# Patient Record
Sex: FEMALE
[De-identification: names, ages, dates, MRNs, and addresses within clinical notes are randomized per-mention and may not be internally consistent; named-entity substitution may affect disease eponyms.]

---

## 2017-08-05 ENCOUNTER — CHARTING TRANS (OUTPATIENT)
Dept: OTHER | Age: 52
End: 2017-08-05

## 2018-05-25 ENCOUNTER — OFFICE VISIT (OUTPATIENT)
Dept: INTERNAL MEDICINE CLINIC | Facility: CLINIC | Age: 53
End: 2018-05-25

## 2018-05-25 VITALS
HEIGHT: 64 IN | WEIGHT: 165 LBS | HEART RATE: 91 BPM | SYSTOLIC BLOOD PRESSURE: 120 MMHG | BODY MASS INDEX: 28.17 KG/M2 | DIASTOLIC BLOOD PRESSURE: 80 MMHG | TEMPERATURE: 99 F

## 2018-05-25 DIAGNOSIS — M25.551 PAIN OF RIGHT HIP JOINT: Primary | ICD-10-CM

## 2018-05-25 PROCEDURE — 99212 OFFICE O/P EST SF 10 MIN: CPT | Performed by: INTERNAL MEDICINE

## 2018-05-25 PROCEDURE — 99213 OFFICE O/P EST LOW 20 MIN: CPT | Performed by: INTERNAL MEDICINE

## 2018-05-25 RX ORDER — METHOCARBAMOL 500 MG/1
500 TABLET, FILM COATED ORAL 3 TIMES DAILY
Qty: 40 TABLET | Refills: 0 | Status: SHIPPED | OUTPATIENT
Start: 2018-05-25 | End: 2018-07-24

## 2018-05-25 RX ORDER — METHYLPREDNISOLONE 4 MG/1
TABLET ORAL
Qty: 1 KIT | Refills: 0 | Status: SHIPPED | OUTPATIENT
Start: 2018-05-25 | End: 2020-08-20 | Stop reason: ALTCHOICE

## 2018-05-25 RX ORDER — HYDROCODONE BITARTRATE AND ACETAMINOPHEN 7.5; 325 MG/1; MG/1
1 TABLET ORAL EVERY 6 HOURS PRN
Qty: 60 TABLET | Refills: 0 | Status: SHIPPED | OUTPATIENT
Start: 2018-05-25 | End: 2018-06-24

## 2018-05-25 NOTE — PROGRESS NOTES
HPI:    Patient ID: Herman Jeronimo is a 46year old female. HPI  right leg swelling -poss pulled muscle by groin area x 1 wk \      Review of Systems   Constitutional: Negative. Respiratory: Negative. Cardiovascular: Negative.     Gastrointestinal: voiced understanding  and agrees with plan        Meds This Visit:  No prescriptions requested or ordered in this encounter    Imaging & Referrals:  None        TT#[id number]

## 2018-11-03 VITALS
SYSTOLIC BLOOD PRESSURE: 120 MMHG | TEMPERATURE: 98.7 F | HEIGHT: 62 IN | RESPIRATION RATE: 20 BRPM | WEIGHT: 152.24 LBS | BODY MASS INDEX: 28.01 KG/M2 | HEART RATE: 89 BPM | DIASTOLIC BLOOD PRESSURE: 60 MMHG

## 2020-03-31 ENCOUNTER — TELEPHONE (OUTPATIENT)
Dept: INTERNAL MEDICINE CLINIC | Facility: CLINIC | Age: 55
End: 2020-03-31

## 2020-03-31 NOTE — TELEPHONE ENCOUNTER
Patient requesting refill for SYNTHROID. States she is now taking 0.122mg. patient scheduled appointment for a physical and pap on 5/1. Patient has not received medication from  since 2016.  Please advice

## 2020-03-31 NOTE — TELEPHONE ENCOUNTER
Pt called back states she was seeing [facility] and last time seen  was more than 1.5 yrs . Pt is currently taking 112 mcg of Levothyroxine . Informed Pt last OV was 2018 and last blood work was 2016 .  Advised Pt Dr Tiffani Gilbert can do a telephone visit

## 2020-04-01 NOTE — TELEPHONE ENCOUNTER
Pt called back informed medication was sent to pharmacy . Pt scheduled a follow up appointment  for 05/01/20 with Dr Jim Ritter .

## 2020-05-01 ENCOUNTER — TELEPHONE (OUTPATIENT)
Dept: INTERNAL MEDICINE CLINIC | Facility: CLINIC | Age: 55
End: 2020-05-01

## 2020-05-01 NOTE — TELEPHONE ENCOUNTER
I have tried calling pt  Busy signal  And  Voice mail only  Left a message to call   To let me know when is the best time to talk to her  Please re schedule  Telephone visit

## 2020-05-04 ENCOUNTER — TELEPHONE (OUTPATIENT)
Dept: INTERNAL MEDICINE CLINIC | Facility: CLINIC | Age: 55
End: 2020-05-04

## 2020-05-04 ENCOUNTER — VIRTUAL PHONE E/M (OUTPATIENT)
Dept: INTERNAL MEDICINE CLINIC | Facility: CLINIC | Age: 55
End: 2020-05-04
Payer: COMMERCIAL

## 2020-05-04 DIAGNOSIS — E03.9 HYPOTHYROIDISM, UNSPECIFIED TYPE: ICD-10-CM

## 2020-05-04 DIAGNOSIS — Z12.31 VISIT FOR SCREENING MAMMOGRAM: ICD-10-CM

## 2020-05-04 DIAGNOSIS — R63.5 WEIGHT GAIN: Primary | ICD-10-CM

## 2020-05-04 DIAGNOSIS — Z12.11 COLON CANCER SCREENING: ICD-10-CM

## 2020-05-04 DIAGNOSIS — E55.9 VITAMIN D DEFICIENCY: ICD-10-CM

## 2020-05-04 PROCEDURE — 99214 OFFICE O/P EST MOD 30 MIN: CPT | Performed by: INTERNAL MEDICINE

## 2020-05-04 NOTE — TELEPHONE ENCOUNTER
I called 4 times goes directly to voicemail  [facility]  [facility] number  W number voicemail as well

## 2020-05-05 NOTE — PROGRESS NOTES
Virtual Telephone Check-In    Maria Abeverley Mars verbally consents to a Virtual/Telephone Check-In visit on 05/04/20. Patient understands and accepts financial responsibility for any deductible, co-insurance and/or co-pays associated with this service.     D swelling. Skin: Negative for rash. Neurological: Negative for syncope, weakness, light-headedness and headaches. Hematological: Negative for adenopathy. Does not bruise/bleed easily.    Psychiatric/Behavioral: Negative for agitation and behavioral pro screening mammogram  Plan: [facility] 2D+3D SCREENING BILAT         (CPT=77067/05497)        Self breast exam advised    (Z12.11) Colon cancer screening  Plan: OP REFERRAL TO [facility] TELEPHONE COLON SCREEN        Follow-up for colonoscopy advised    Low back

## 2020-05-09 ENCOUNTER — APPOINTMENT (OUTPATIENT)
Dept: LAB | Age: 55
End: 2020-05-09
Attending: INTERNAL MEDICINE
Payer: COMMERCIAL

## 2020-05-09 DIAGNOSIS — R63.5 WEIGHT GAIN: ICD-10-CM

## 2020-05-09 DIAGNOSIS — E55.9 VITAMIN D DEFICIENCY: ICD-10-CM

## 2020-05-09 PROCEDURE — 84439 ASSAY OF FREE THYROXINE: CPT | Performed by: INTERNAL MEDICINE

## 2020-05-09 PROCEDURE — 36415 COLL VENOUS BLD VENIPUNCTURE: CPT | Performed by: INTERNAL MEDICINE

## 2020-05-09 PROCEDURE — 84443 ASSAY THYROID STIM HORMONE: CPT | Performed by: INTERNAL MEDICINE

## 2020-05-09 PROCEDURE — 80061 LIPID PANEL: CPT | Performed by: INTERNAL MEDICINE

## 2020-05-09 PROCEDURE — 81015 MICROSCOPIC EXAM OF URINE: CPT

## 2020-05-09 PROCEDURE — 80053 COMPREHEN METABOLIC PANEL: CPT | Performed by: INTERNAL MEDICINE

## 2020-05-09 PROCEDURE — 85027 COMPLETE CBC AUTOMATED: CPT | Performed by: INTERNAL MEDICINE

## 2020-05-09 PROCEDURE — 82306 VITAMIN D 25 HYDROXY: CPT | Performed by: INTERNAL MEDICINE

## 2020-05-09 PROCEDURE — 83036 HEMOGLOBIN GLYCOSYLATED A1C: CPT | Performed by: INTERNAL MEDICINE

## 2020-05-13 NOTE — TELEPHONE ENCOUNTER
Patient called, requesting refill of the following prescription.     Levothyroxine Sodium 112 MCG Oral Tab

## 2020-05-14 RX ORDER — LEVOTHYROXINE SODIUM 0.12 MG/1
125 TABLET ORAL DAILY
Qty: 90 TABLET | Refills: 0 | OUTPATIENT
Start: 2020-05-14

## 2020-05-14 NOTE — TELEPHONE ENCOUNTER
This is being sent to you without review by the Triage staff due to the high call volumes created by the COVID-19 virus, per the email sent by Dr. Zoe Swain on 3/19/20. Thank you for your support.     Centralized Nurse Triage Team

## 2020-08-20 ENCOUNTER — NURSE TRIAGE (OUTPATIENT)
Dept: INTERNAL MEDICINE CLINIC | Facility: CLINIC | Age: 55
End: 2020-08-20

## 2020-08-20 ENCOUNTER — VIRTUAL PHONE E/M (OUTPATIENT)
Dept: INTERNAL MEDICINE CLINIC | Facility: CLINIC | Age: 55
End: 2020-08-20
Payer: COMMERCIAL

## 2020-08-20 ENCOUNTER — LAB ENCOUNTER (OUTPATIENT)
Dept: LAB | Facility: HOSPITAL | Age: 55
End: 2020-08-20
Attending: INTERNAL MEDICINE
Payer: COMMERCIAL

## 2020-08-20 DIAGNOSIS — R50.9 FEVER, UNSPECIFIED FEVER CAUSE: Primary | ICD-10-CM

## 2020-08-20 DIAGNOSIS — R52 BODY ACHES: ICD-10-CM

## 2020-08-20 DIAGNOSIS — R51.9 PERSISTENT HEADACHES: ICD-10-CM

## 2020-08-20 DIAGNOSIS — R50.9 FEVER, UNSPECIFIED FEVER CAUSE: ICD-10-CM

## 2020-08-20 DIAGNOSIS — E03.9 HYPOTHYROIDISM, UNSPECIFIED TYPE: Chronic | ICD-10-CM

## 2020-08-20 PROCEDURE — 99213 OFFICE O/P EST LOW 20 MIN: CPT | Performed by: INTERNAL MEDICINE

## 2020-08-20 RX ORDER — LEVOTHYROXINE SODIUM 137 UG/1
137 TABLET ORAL
Qty: 90 TABLET | Refills: 0 | Status: SHIPPED | OUTPATIENT
Start: 2020-08-20 | End: 2020-09-10 | Stop reason: DRUGHIGH

## 2020-08-20 RX ORDER — LEVOTHYROXINE SODIUM 137 UG/1
137 TABLET ORAL
Qty: 90 TABLET | Refills: 0 | Status: SHIPPED | OUTPATIENT
Start: 2020-08-20 | End: 2020-08-20

## 2020-08-20 NOTE — TELEPHONE ENCOUNTER
Action Requested: Summary for Provider     []  Critical Lab, Recommendations Needed  [] Need Additional Advice  []   FYI    []   Need Orders  [] Need Medications Sent to Pharmacy  []  Other     SUMMARY: per patient she went to a long term party on Tuesday

## 2020-08-20 NOTE — PROGRESS NOTES
Telemedicine Visit for Respiratory Illness - Potential COVID-19 Infection    Virtual/Telephone Check-In     Geneva Kong verbally consents to a Telephone Check-In service on 08/20/20.  Patient understands and accepts financial responsibility for any deduc Active Problem List:     Left SNHL     Hypothyroidism    Current Outpatient Medications   Medication Sig Dispense Refill   • Levothyroxine Sodium 137 MCG Oral Tab Take 137 mcg by mouth before breakfast. 90 tablet 0     No Known Allergies    Physical Exam

## 2020-08-21 ENCOUNTER — TELEPHONE (OUTPATIENT)
Dept: INTERNAL MEDICINE CLINIC | Facility: CLINIC | Age: 55
End: 2020-08-21

## 2020-08-21 DIAGNOSIS — R50.9 FEVER, UNSPECIFIED FEVER CAUSE: Primary | ICD-10-CM

## 2020-08-21 LAB — SARS-COV-2 RNA RESP QL NAA+PROBE: NOT DETECTED

## 2020-08-21 NOTE — TELEPHONE ENCOUNTER
Spoke with patient ( verified)--requesting Cvid test results from yesterday. Relayed to patient test shows in process--unable to determine when results will be finalized--patient verbalizes understanding and agreement.     Sent to Dr. Meng Russo per teena

## 2020-08-21 NOTE — TELEPHONE ENCOUNTER
Spoke with patient ( verified) and relayed Dr. Felisa Packer message below--patient verbalizes understanding, but reports:    \"I have been in bed all day and my temperature keeps fluctuating--101.9, then 98.9, then back up again.  I must have some kind of b

## 2020-08-22 ENCOUNTER — APPOINTMENT (OUTPATIENT)
Dept: LAB | Age: 55
End: 2020-08-22
Attending: INTERNAL MEDICINE
Payer: COMMERCIAL

## 2020-08-22 LAB
ALBUMIN SERPL-MCNC: 2.8 G/DL (ref 3.4–5)
ALBUMIN/GLOB SERPL: 0.5 {RATIO} (ref 1–2)
ALP LIVER SERPL-CCNC: 73 U/L (ref 41–108)
ALT SERPL-CCNC: 19 U/L (ref 13–56)
ANION GAP SERPL CALC-SCNC: 7 MMOL/L (ref 0–18)
AST SERPL-CCNC: 19 U/L (ref 15–37)
BILIRUB SERPL-MCNC: 0.7 MG/DL (ref 0.1–2)
BILIRUB UR QL: NEGATIVE
BUN BLD-MCNC: 18 MG/DL (ref 7–18)
BUN/CREAT SERPL: 22.5 (ref 10–20)
CALCIUM BLD-MCNC: 9 MG/DL (ref 8.5–10.1)
CHLORIDE SERPL-SCNC: 98 MMOL/L (ref 98–112)
CO2 SERPL-SCNC: 27 MMOL/L (ref 21–32)
COLOR UR: YELLOW
CREAT BLD-MCNC: 0.8 MG/DL (ref 0.55–1.02)
GLOBULIN PLAS-MCNC: 5.3 G/DL (ref 2.8–4.4)
GLUCOSE BLD-MCNC: 124 MG/DL (ref 70–99)
GLUCOSE UR-MCNC: 50 MG/DL
HYALINE CASTS #/AREA URNS AUTO: 25 /LPF
KETONES UR-MCNC: 20 MG/DL
LEUKOCYTE ESTERASE UR QL STRIP.AUTO: NEGATIVE
M PROTEIN MFR SERPL ELPH: 8.1 G/DL (ref 6.4–8.2)
NITRITE UR QL STRIP.AUTO: NEGATIVE
OSMOLALITY SERPL CALC.SUM OF ELEC: 277 MOSM/KG (ref 275–295)
PATIENT FASTING Y/N/NP: YES
PH UR: 6 [PH] (ref 5–8)
POTASSIUM SERPL-SCNC: 3.8 MMOL/L (ref 3.5–5.1)
PROT UR-MCNC: >=500 MG/DL
RBC #/AREA URNS AUTO: 9 /HPF
SODIUM SERPL-SCNC: 132 MMOL/L (ref 136–145)
UROBILINOGEN UR STRIP-ACNC: <2
WBC #/AREA URNS AUTO: 5 /HPF

## 2020-08-22 PROCEDURE — 84466 ASSAY OF TRANSFERRIN: CPT | Performed by: INTERNAL MEDICINE

## 2020-08-22 PROCEDURE — 80053 COMPREHEN METABOLIC PANEL: CPT | Performed by: INTERNAL MEDICINE

## 2020-08-22 PROCEDURE — 36415 COLL VENOUS BLD VENIPUNCTURE: CPT | Performed by: INTERNAL MEDICINE

## 2020-08-22 PROCEDURE — 85025 COMPLETE CBC W/AUTO DIFF WBC: CPT | Performed by: INTERNAL MEDICINE

## 2020-08-22 PROCEDURE — 84443 ASSAY THYROID STIM HORMONE: CPT | Performed by: INTERNAL MEDICINE

## 2020-08-22 PROCEDURE — 82746 ASSAY OF FOLIC ACID SERUM: CPT | Performed by: INTERNAL MEDICINE

## 2020-08-22 PROCEDURE — 83540 ASSAY OF IRON: CPT | Performed by: INTERNAL MEDICINE

## 2020-08-22 PROCEDURE — 82607 VITAMIN B-12: CPT | Performed by: INTERNAL MEDICINE

## 2020-08-22 PROCEDURE — 81001 URINALYSIS AUTO W/SCOPE: CPT | Performed by: INTERNAL MEDICINE

## 2020-08-22 NOTE — TELEPHONE ENCOUNTER
Still with fever no other sympt    No urinary sympt ,no sore throat , no ear ache   Has nausea   No breathing difficulty   Upset stomach , not watery    Had this once before --   BRAT DIET , STAY HYDRATED CALL BACK IF NOT BETTER  IF WORSENING THEN GO TO ER

## 2020-08-23 ENCOUNTER — TELEPHONE (OUTPATIENT)
Dept: INTERNAL MEDICINE CLINIC | Facility: CLINIC | Age: 55
End: 2020-08-23

## 2020-08-23 DIAGNOSIS — R80.9 PROTEINURIA, UNSPECIFIED TYPE: ICD-10-CM

## 2020-08-23 DIAGNOSIS — E03.9 HYPOTHYROIDISM, UNSPECIFIED TYPE: Primary | ICD-10-CM

## 2020-08-23 DIAGNOSIS — R50.9 FEVER, UNSPECIFIED FEVER CAUSE: ICD-10-CM

## 2020-08-23 NOTE — TELEPHONE ENCOUNTER
Reviewed labs-noted tsh is low but pt states she hasnt been taking it for 4-5 days --   Advised to hold off on taking the thyroid medications  Most likely will be needing a lower dose  Advised patient to follow-up with PCP  Referral for nephrology placed f

## 2020-08-24 ENCOUNTER — APPOINTMENT (OUTPATIENT)
Dept: LAB | Age: 55
End: 2020-08-24
Attending: INTERNAL MEDICINE
Payer: COMMERCIAL

## 2020-08-24 DIAGNOSIS — R50.9 FEVER, UNSPECIFIED FEVER CAUSE: ICD-10-CM

## 2020-08-25 ENCOUNTER — TELEPHONE (OUTPATIENT)
Dept: INTERNAL MEDICINE CLINIC | Facility: CLINIC | Age: 55
End: 2020-08-25

## 2020-08-25 LAB — SARS-COV-2 RNA RESP QL NAA+PROBE: NOT DETECTED

## 2020-09-09 ENCOUNTER — TELEPHONE (OUTPATIENT)
Dept: INTERNAL MEDICINE CLINIC | Facility: CLINIC | Age: 55
End: 2020-09-09

## 2020-09-09 DIAGNOSIS — E03.9 HYPOTHYROIDISM, UNSPECIFIED TYPE: Primary | Chronic | ICD-10-CM

## 2020-09-09 NOTE — TELEPHONE ENCOUNTER
Okay to send instructions via [employer]. Pt stts  advised that she would be contacted by  with a new dose for levothyroxine. Pt has not rec'd any direction on medication.   Please advise as pt has not taken anything and now she's feeli

## 2020-09-09 NOTE — TELEPHONE ENCOUNTER
I tired to call pt  tsh is low  Which means lower  Thyroid med dose  She is on 137  Lower to 125 mcg po qd   erx sent to pharmacy for   Repeat tsh in 2 mnths

## 2020-09-09 NOTE — TELEPHONE ENCOUNTER
Pt was told based on her blood work that the current dosage is too high on Rx levothyroxine 137 MCG. Pt said she has not been taking it.   Please advise        Current Outpatient Medications   Medication Sig Dispense Refill   • Levothyroxine Sodium 137 MCG

## 2020-09-09 NOTE — TELEPHONE ENCOUNTER
U spoke with pt  Stress test this Friday 8AM  Clearance needed by Friday  Latest by [facility]

## 2020-09-10 ENCOUNTER — PATIENT MESSAGE (OUTPATIENT)
Dept: INTERNAL MEDICINE CLINIC | Facility: CLINIC | Age: 55
End: 2020-09-10

## 2020-09-10 NOTE — TELEPHONE ENCOUNTER
Went over 8/22/2020 lab results and notes below with patient. Scheduled nurse visit for tomorrow for a B-12 injection. Advised to disregard the disclaimer about biotin if she is not taking it. Patient did not have any further questions.    Viewed by [employer]

## 2020-09-10 NOTE — TELEPHONE ENCOUNTER
Patient was left a message to call back. Transfer to triage dept [id number]    Left msg that new Rx sent 9/9/10 and repeat TSH in 2 months. Order in chart. Patient to call back if has any questions and confirm she received message.    When she calls, remind her

## 2020-09-10 NOTE — TELEPHONE ENCOUNTER
From: Noemy Monday  To: Demarcus Ashton MD  Sent: 9/10/2020 7:46 AM CDT  Subject: Test Results Question    Good morning Dr Mary Burnham,  I’m perplexed by my test results, most of the results that are out of range states that it may have to do with taking Bio

## 2020-09-11 ENCOUNTER — NURSE ONLY (OUTPATIENT)
Dept: INTERNAL MEDICINE CLINIC | Facility: CLINIC | Age: 55
End: 2020-09-11
Payer: COMMERCIAL

## 2020-09-11 ENCOUNTER — TELEPHONE (OUTPATIENT)
Dept: INTERNAL MEDICINE CLINIC | Facility: CLINIC | Age: 55
End: 2020-09-11

## 2020-09-11 DIAGNOSIS — E53.8 VITAMIN B 12 DEFICIENCY: Primary | ICD-10-CM

## 2020-09-11 PROCEDURE — 96372 THER/PROPH/DIAG INJ SC/IM: CPT | Performed by: INTERNAL MEDICINE

## 2020-09-11 RX ADMIN — CYANOCOBALAMIN 1000 MCG: 1000 INJECTION INTRAMUSCULAR; SUBCUTANEOUS at 16:07:00

## 2020-09-11 NOTE — PROGRESS NOTES
Pt. Audrey Atkinson to office as a nurse visit name and date of birth verified, pt received a B-12 vaccine on 9/11/2020. Pt tolerated well vaccine and no reaction.

## 2020-09-11 NOTE — TELEPHONE ENCOUNTER
Dr. Deepa Mcclain pt is scheduled for Nurse visit today 9/11/2020 for B12 injection. Can you place order for injection? Also, please verify how many times you would like pt to return for B12 injections.  Thanks

## 2020-12-02 ENCOUNTER — HOSPITAL ENCOUNTER (OUTPATIENT)
Dept: MAMMOGRAPHY | Age: 55
Discharge: HOME | End: 2020-12-02
Attending: INTERNAL MEDICINE
Payer: COMMERCIAL

## 2020-12-02 DIAGNOSIS — Z12.31 VISIT FOR SCREENING MAMMOGRAM: ICD-10-CM

## 2020-12-02 PROCEDURE — 77067 SCR MAMMO BI INCL CAD: CPT | Performed by: INTERNAL MEDICINE

## 2020-12-02 PROCEDURE — 77063 BREAST TOMOSYNTHESIS BI: CPT | Performed by: INTERNAL MEDICINE

## 2020-12-23 RX ORDER — LEVOTHYROXINE SODIUM 0.12 MG/1
125 TABLET ORAL
Qty: 90 TABLET | Refills: 0 | Status: SHIPPED | OUTPATIENT
Start: 2020-12-23 | End: 2021-03-23

## 2020-12-25 RX ORDER — ERGOCALCIFEROL (VITAMIN D2) 1250 MCG
50000 CAPSULE ORAL WEEKLY
Qty: 12 CAPSULE | Refills: 1 | Status: SHIPPED | OUTPATIENT
Start: 2020-12-25 | End: 2021-03-25

## 2021-03-23 RX ORDER — LEVOTHYROXINE SODIUM 0.12 MG/1
125 TABLET ORAL
Qty: 90 TABLET | Refills: 0 | Status: SHIPPED | OUTPATIENT
Start: 2021-03-23 | End: 2021-09-18

## 2021-03-23 NOTE — TELEPHONE ENCOUNTER
Per pharmacy pt is requesting refill for the following medication.       •  Levothyroxine Sodium 125 MCG Oral Tab, Take 1 tablet (125 mcg total) by mouth before breakfast., Disp: 90 tablet, Rfl: 0

## 2021-09-05 NOTE — TELEPHONE ENCOUNTER
Problems   The patient or proxy has not reviewed this information.    Medications   The patient or proxy has not reviewed this information, and there are updates pending:   Requested Medication Additions Start Date Reported By  Comments   JACQUI pt reports overall good PO intake and appetite PTA, though notes eating less the last few days due to feeling unwell. Consume regular diet, no restrictions or food allergies. Pt denies chewing/swallowing difficulty, nausea, vomiting, diarrhea, constipation PTA. Takes multivitamin, folic acid and vitamin B12.

## 2021-09-17 ENCOUNTER — TELEPHONE (OUTPATIENT)
Dept: INTERNAL MEDICINE CLINIC | Facility: CLINIC | Age: 56
End: 2021-09-17

## 2021-09-17 NOTE — TELEPHONE ENCOUNTER
Spoke with patient--asking Dr. Yaron Rowan for yearly lab orders, as she has been out of Levothyroxine for at least 3 months and is aware PCP would need to know her level before sending in new Rx.  Patient reports weight gain, fatigue and hair loss since not ta

## 2021-09-18 ENCOUNTER — LAB ENCOUNTER (OUTPATIENT)
Dept: LAB | Age: 56
End: 2021-09-18
Attending: INTERNAL MEDICINE
Payer: COMMERCIAL

## 2021-09-18 DIAGNOSIS — Z00.00 ROUTINE GENERAL MEDICAL EXAMINATION AT A HEALTH CARE FACILITY: ICD-10-CM

## 2021-09-18 LAB
ALBUMIN SERPL-MCNC: 4 G/DL (ref 3.4–5)
ALBUMIN/GLOB SERPL: 0.9 {RATIO} (ref 1–2)
ALP LIVER SERPL-CCNC: 82 U/L
ALT SERPL-CCNC: 38 U/L
ANION GAP SERPL CALC-SCNC: 6 MMOL/L (ref 0–18)
AST SERPL-CCNC: 25 U/L (ref 15–37)
BILIRUB SERPL-MCNC: 0.4 MG/DL (ref 0.1–2)
BUN BLD-MCNC: 19 MG/DL (ref 7–18)
BUN/CREAT SERPL: 25.7 (ref 10–20)
CALCIUM BLD-MCNC: 9.3 MG/DL (ref 8.5–10.1)
CHLORIDE SERPL-SCNC: 110 MMOL/L (ref 98–112)
CHOLEST SERPL-MCNC: 229 MG/DL (ref ?–200)
CO2 SERPL-SCNC: 26 MMOL/L (ref 21–32)
CREAT BLD-MCNC: 0.74 MG/DL
DEPRECATED RDW RBC AUTO: 45.1 FL (ref 35.1–46.3)
ERYTHROCYTE [DISTWIDTH] IN BLOOD BY AUTOMATED COUNT: 13.3 % (ref 11–15)
EST. AVERAGE GLUCOSE BLD GHB EST-MCNC: 114 MG/DL (ref 68–126)
GLOBULIN PLAS-MCNC: 4.6 G/DL (ref 2.8–4.4)
GLUCOSE BLD-MCNC: 67 MG/DL (ref 70–99)
HBA1C MFR BLD HPLC: 5.6 % (ref ?–5.7)
HCT VFR BLD AUTO: 35.2 %
HDLC SERPL-MCNC: 75 MG/DL (ref 40–59)
HGB BLD-MCNC: 11.3 G/DL
LDLC SERPL CALC-MCNC: 143 MG/DL (ref ?–100)
MCH RBC QN AUTO: 29.7 PG (ref 26–34)
MCHC RBC AUTO-ENTMCNC: 32.1 G/DL (ref 31–37)
MCV RBC AUTO: 92.4 FL
NONHDLC SERPL-MCNC: 154 MG/DL (ref ?–130)
OSMOLALITY SERPL CALC.SUM OF ELEC: 295 MOSM/KG (ref 275–295)
PATIENT FASTING Y/N/NP: YES
PATIENT FASTING Y/N/NP: YES
PLATELET # BLD AUTO: 209 10(3)UL (ref 150–450)
POTASSIUM SERPL-SCNC: 4.2 MMOL/L (ref 3.5–5.1)
PROT SERPL-MCNC: 8.6 G/DL (ref 6.4–8.2)
RBC # BLD AUTO: 3.81 X10(6)UL
SODIUM SERPL-SCNC: 142 MMOL/L (ref 136–145)
T4 FREE SERPL-MCNC: 0.5 NG/DL (ref 0.8–1.7)
TRIGL SERPL-MCNC: 65 MG/DL (ref 30–149)
TSI SER-ACNC: 88.3 MIU/ML (ref 0.36–3.74)
VLDLC SERPL CALC-MCNC: 12 MG/DL (ref 0–30)
WBC # BLD AUTO: 3.6 X10(3) UL (ref 4–11)

## 2021-09-18 PROCEDURE — 80061 LIPID PANEL: CPT

## 2021-09-18 PROCEDURE — 36415 COLL VENOUS BLD VENIPUNCTURE: CPT

## 2021-09-18 PROCEDURE — 84443 ASSAY THYROID STIM HORMONE: CPT

## 2021-09-18 PROCEDURE — 83036 HEMOGLOBIN GLYCOSYLATED A1C: CPT

## 2021-09-18 PROCEDURE — 85027 COMPLETE CBC AUTOMATED: CPT

## 2021-09-18 PROCEDURE — 81015 MICROSCOPIC EXAM OF URINE: CPT

## 2021-09-18 PROCEDURE — 84439 ASSAY OF FREE THYROXINE: CPT

## 2021-09-18 PROCEDURE — 80053 COMPREHEN METABOLIC PANEL: CPT

## 2021-09-18 RX ORDER — LEVOTHYROXINE SODIUM 0.12 MG/1
125 TABLET ORAL
Qty: 90 TABLET | Refills: 0 | Status: CANCELLED | OUTPATIENT
Start: 2021-09-18

## 2021-09-18 NOTE — TELEPHONE ENCOUNTER
Spoke with patient ( verified) and relayed Dr. Shana Chisholm message below and will complete fasting labs-patient verbalizes understanding and agreement. No further questions/concerns at this time.

## 2021-11-05 ENCOUNTER — OFFICE VISIT (OUTPATIENT)
Dept: INTERNAL MEDICINE CLINIC | Facility: CLINIC | Age: 56
End: 2021-11-05
Payer: COMMERCIAL

## 2021-11-05 VITALS
SYSTOLIC BLOOD PRESSURE: 127 MMHG | DIASTOLIC BLOOD PRESSURE: 84 MMHG | HEIGHT: 63 IN | HEART RATE: 78 BPM | WEIGHT: 168 LBS | BODY MASS INDEX: 29.77 KG/M2

## 2021-11-05 DIAGNOSIS — Z01.419 GYNECOLOGIC EXAM NORMAL: ICD-10-CM

## 2021-11-05 DIAGNOSIS — E53.8 VITAMIN B 12 DEFICIENCY: ICD-10-CM

## 2021-11-05 DIAGNOSIS — Z12.11 COLON CANCER SCREENING: ICD-10-CM

## 2021-11-05 DIAGNOSIS — E03.9 HYPOTHYROIDISM, UNSPECIFIED TYPE: ICD-10-CM

## 2021-11-05 DIAGNOSIS — M54.2 NECK PAIN: ICD-10-CM

## 2021-11-05 DIAGNOSIS — D17.0 LIPOMA OF NECK: ICD-10-CM

## 2021-11-05 DIAGNOSIS — Z00.00 ROUTINE GENERAL MEDICAL EXAMINATION AT A HEALTH CARE FACILITY: Primary | ICD-10-CM

## 2021-11-05 DIAGNOSIS — Z12.31 VISIT FOR SCREENING MAMMOGRAM: ICD-10-CM

## 2021-11-05 PROCEDURE — 3008F BODY MASS INDEX DOCD: CPT | Performed by: INTERNAL MEDICINE

## 2021-11-05 PROCEDURE — 99396 PREV VISIT EST AGE 40-64: CPT | Performed by: INTERNAL MEDICINE

## 2021-11-05 PROCEDURE — 3074F SYST BP LT 130 MM HG: CPT | Performed by: INTERNAL MEDICINE

## 2021-11-05 PROCEDURE — 3079F DIAST BP 80-89 MM HG: CPT | Performed by: INTERNAL MEDICINE

## 2021-11-05 RX ORDER — CYANOCOBALAMIN 1000 UG/ML
1000 INJECTION INTRAMUSCULAR; SUBCUTANEOUS
Status: SHIPPED | OUTPATIENT
Start: 2021-11-05

## 2021-11-05 RX ORDER — ERGOCALCIFEROL (VITAMIN D2) 1250 MCG
50000 CAPSULE ORAL WEEKLY
Qty: 12 CAPSULE | Refills: 0 | Status: SHIPPED | OUTPATIENT
Start: 2021-11-05 | End: 2022-02-03

## 2021-11-16 NOTE — PROGRESS NOTES
HPI:    Patient ID: Tara Keating is a 64year old female.     HPI    Examination  Generally healthy  /84 (BP Location: Left arm, Patient Position: Sitting, Cuff Size: adult)   Pulse 78   Ht 5' 3\" (1.6 m)   Wt 168 lb (76.2 kg)   LMP 01/01/2012 (Appr mouth before breakfast. 90 tablet 0     Allergies:No Known Allergies    HISTORY:  Past Medical History:   Diagnosis Date   • Cyst of left breast 2009    drainage   • Primary hypothyroidism       Past Surgical History:   Procedure Laterality Date   • CYST A Findings: No erythema or rash. Neurological:      Mental Status: She is alert.               ASSESSMENT/PLAN:     (Z00.00) Routine general medical examination at a health care facility  (primary encounter diagnosis)  Plan: generally healthy  Colon ca scre

## 2021-11-29 ENCOUNTER — OFFICE VISIT (OUTPATIENT)
Dept: OTOLARYNGOLOGY | Facility: CLINIC | Age: 56
End: 2021-11-29
Payer: COMMERCIAL

## 2021-11-29 VITALS — HEIGHT: 64 IN | BODY MASS INDEX: 28.17 KG/M2 | WEIGHT: 165 LBS

## 2021-11-29 DIAGNOSIS — M54.2 NECK PAIN: ICD-10-CM

## 2021-11-29 DIAGNOSIS — R22.1 NECK MASS: ICD-10-CM

## 2021-11-29 DIAGNOSIS — G47.33 OBSTRUCTIVE SLEEP APNEA: Primary | ICD-10-CM

## 2021-11-29 DIAGNOSIS — G47.33 OSA (OBSTRUCTIVE SLEEP APNEA): ICD-10-CM

## 2021-11-29 PROCEDURE — 3008F BODY MASS INDEX DOCD: CPT | Performed by: OTOLARYNGOLOGY

## 2021-11-29 PROCEDURE — 99243 OFF/OP CNSLTJ NEW/EST LOW 30: CPT | Performed by: OTOLARYNGOLOGY

## 2021-11-29 RX ORDER — CYCLOBENZAPRINE HCL 5 MG
5 TABLET ORAL NIGHTLY
Qty: 30 TABLET | Refills: 1 | Status: SHIPPED | OUTPATIENT
Start: 2021-11-29

## 2021-11-29 RX ORDER — CELECOXIB 200 MG/1
200 CAPSULE ORAL DAILY PRN
Qty: 30 CAPSULE | Refills: 0 | Status: SHIPPED | OUTPATIENT
Start: 2021-11-29 | End: 2021-12-29

## 2021-11-30 NOTE — PROGRESS NOTES
Joss Goldberg is a 64year old female. Patient presents with:  Neck Pain: Pt has neck pain behind neck started a couple months ago .  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 Cardio Negative Chest pain, irregular heartbeat/palpitations and syncope. GI Negative Abdominal pain and diarrhea. Endocrine Negative Cold intolerance and heat intolerance. Neuro Negative Tremors.    Psych Negative Anxiety and Take 1 capsule (50,000 Units total) by mouth once a week., Disp: 12 capsule, Rfl: 0  •  ergocalciferol 1.25 MG (68663 UT) Oral Cap, Take 1 capsule (50,000 Units total) by mouth every 30 (thirty) days. , Disp: 3 capsule, Rfl: 3  •  levothyroxine 125 MCG Oral

## 2021-12-01 ENCOUNTER — TELEPHONE (OUTPATIENT)
Dept: OTOLARYNGOLOGY | Facility: CLINIC | Age: 56
End: 2021-12-01

## 2021-12-01 NOTE — TELEPHONE ENCOUNTER
Patient requesting MRI to be resend as an URGENT matter in order for insurance to authorize it within 3 days.  Please advise

## 2021-12-20 ENCOUNTER — LAB ENCOUNTER (OUTPATIENT)
Dept: LAB | Age: 56
End: 2021-12-20
Attending: INTERNAL MEDICINE
Payer: COMMERCIAL

## 2021-12-20 DIAGNOSIS — E03.9 HYPOTHYROIDISM, UNSPECIFIED TYPE: ICD-10-CM

## 2021-12-20 PROCEDURE — 84443 ASSAY THYROID STIM HORMONE: CPT

## 2021-12-20 PROCEDURE — 36415 COLL VENOUS BLD VENIPUNCTURE: CPT

## 2021-12-20 PROCEDURE — 84439 ASSAY OF FREE THYROXINE: CPT

## 2021-12-21 RX ORDER — LEVOTHYROXINE SODIUM 0.12 MG/1
TABLET ORAL
Qty: 90 TABLET | Refills: 0 | Status: SHIPPED | OUTPATIENT
Start: 2021-12-21 | End: 2021-12-22

## 2021-12-21 RX ORDER — LEVOTHYROXINE SODIUM 0.12 MG/1
125 TABLET ORAL
Qty: 90 TABLET | Refills: 0 | Status: SHIPPED | OUTPATIENT
Start: 2021-12-21 | End: 2021-12-22

## 2022-03-25 ENCOUNTER — PATIENT MESSAGE (OUTPATIENT)
Dept: INTERNAL MEDICINE CLINIC | Facility: CLINIC | Age: 57
End: 2022-03-25

## 2022-03-25 RX ORDER — ERGOCALCIFEROL 1.25 MG/1
50000 CAPSULE ORAL
Qty: 3 CAPSULE | Refills: 3 | Status: SHIPPED | OUTPATIENT
Start: 2022-03-25 | End: 2022-04-06 | Stop reason: ALTCHOICE

## 2022-03-25 NOTE — TELEPHONE ENCOUNTER
Please review. Protocol failed / No protocol. Requested Prescriptions   Pending Prescriptions Disp Refills    ergocalciferol 1.25 MG (31788 UT) Oral Cap 3 capsule 3     Sig: Take 1 capsule (50,000 Units total) by mouth every 30 (thirty) days.         There is no refill protocol information for this order          Future Appointments         Provider Department Appt Notes    In 1 week [facility] [address] Left breast is tendor    In 1 week GI COLON SCREENING [address] GI PROCEDURE Telephone Screening - per pt she has no active GI symptoms           Recent Outpatient Visits              3 months ago Obstructive sleep apnea    [facility] Stacy Pelaez MD    Office Visit    4 months ago Routine general medical examination at a health care facility    [facility], Iris Holstein, MD    Office Visit    1 year ago Vitamin B 12 deficiency    [facility], [address]    Nurse Only    1 year ago Fever, unspecified fever cause    [facility], Vera Rodriguez MD    [facility]    [facility]    [facility]

## 2022-04-05 RX ORDER — LEVOTHYROXINE SODIUM 112 UG/1
112 TABLET ORAL
Qty: 90 TABLET | Refills: 0 | Status: SHIPPED | OUTPATIENT
Start: 2022-04-05 | End: 2022-04-06 | Stop reason: ALTCHOICE

## 2022-04-06 ENCOUNTER — OFFICE VISIT (OUTPATIENT)
Dept: INTERNAL MEDICINE CLINIC | Facility: CLINIC | Age: 57
End: 2022-04-06
Payer: COMMERCIAL

## 2022-04-06 ENCOUNTER — HOSPITAL ENCOUNTER (OUTPATIENT)
Dept: MAMMOGRAPHY | Age: 57
Discharge: HOME | End: 2022-04-06
Attending: INTERNAL MEDICINE
Payer: COMMERCIAL

## 2022-04-06 ENCOUNTER — LAB ENCOUNTER (OUTPATIENT)
Dept: LAB | Age: 57
End: 2022-04-06
Attending: INTERNAL MEDICINE
Payer: COMMERCIAL

## 2022-04-06 ENCOUNTER — NURSE TRIAGE (OUTPATIENT)
Dept: INTERNAL MEDICINE CLINIC | Facility: CLINIC | Age: 57
End: 2022-04-06

## 2022-04-06 VITALS
SYSTOLIC BLOOD PRESSURE: 140 MMHG | RESPIRATION RATE: 16 BRPM | WEIGHT: 175 LBS | HEART RATE: 80 BPM | BODY MASS INDEX: 29.88 KG/M2 | DIASTOLIC BLOOD PRESSURE: 84 MMHG | HEIGHT: 64 IN

## 2022-04-06 DIAGNOSIS — E03.9 HYPOTHYROIDISM, UNSPECIFIED TYPE: ICD-10-CM

## 2022-04-06 DIAGNOSIS — H92.01 ACUTE EAR PAIN, RIGHT: ICD-10-CM

## 2022-04-06 DIAGNOSIS — E03.9 HYPOTHYROIDISM, UNSPECIFIED TYPE: Primary | ICD-10-CM

## 2022-04-06 DIAGNOSIS — H66.91 ACUTE EAR INFECTION, RIGHT: ICD-10-CM

## 2022-04-06 DIAGNOSIS — Z12.31 VISIT FOR SCREENING MAMMOGRAM: ICD-10-CM

## 2022-04-06 LAB — TSI SER-ACNC: 1.41 MIU/ML (ref 0.36–3.74)

## 2022-04-06 PROCEDURE — 77063 BREAST TOMOSYNTHESIS BI: CPT | Performed by: INTERNAL MEDICINE

## 2022-04-06 PROCEDURE — 99213 OFFICE O/P EST LOW 20 MIN: CPT | Performed by: NURSE PRACTITIONER

## 2022-04-06 PROCEDURE — 3077F SYST BP >= 140 MM HG: CPT | Performed by: NURSE PRACTITIONER

## 2022-04-06 PROCEDURE — 77067 SCR MAMMO BI INCL CAD: CPT | Performed by: INTERNAL MEDICINE

## 2022-04-06 PROCEDURE — 84443 ASSAY THYROID STIM HORMONE: CPT

## 2022-04-06 PROCEDURE — 3008F BODY MASS INDEX DOCD: CPT | Performed by: NURSE PRACTITIONER

## 2022-04-06 PROCEDURE — 36415 COLL VENOUS BLD VENIPUNCTURE: CPT

## 2022-04-06 PROCEDURE — 3079F DIAST BP 80-89 MM HG: CPT | Performed by: NURSE PRACTITIONER

## 2022-04-06 RX ORDER — LEVOTHYROXINE SODIUM 0.12 MG/1
TABLET ORAL
COMMUNITY
Start: 2022-03-20

## 2022-04-06 RX ORDER — AZITHROMYCIN 250 MG/1
TABLET, FILM COATED ORAL
Qty: 6 TABLET | Refills: 0 | Status: SHIPPED | OUTPATIENT
Start: 2022-04-06 | End: 2022-04-11

## 2022-04-06 NOTE — TELEPHONE ENCOUNTER
Action Requested: Summary for Provider     []  Critical Lab, Recommendations Needed  [] Need Additional Advice  []   FYI    []   Need Orders  [] Need Medications Sent to Pharmacy  []  Other     SUMMARY: Patient c/o right ear pain since Monday, has normal ear hearing loss to that ear, has pain     Denies: fever, chills, stiff neck, pus, drainage    Future Appointments   Date Time Provider Sandra Montero   4/6/2022  3:00 PM FREDDIE Barillas   4/7/2022 10:30 AM GI COLON SCREENING [address] [facility] None         Reason for call: Acute  Onset: Data Unavailable

## 2022-04-07 ENCOUNTER — NURSE ONLY (OUTPATIENT)
Dept: GASTROENTEROLOGY | Facility: CLINIC | Age: 57
End: 2022-04-07

## 2022-04-07 ENCOUNTER — PATIENT MESSAGE (OUTPATIENT)
Dept: INTERNAL MEDICINE CLINIC | Facility: CLINIC | Age: 57
End: 2022-04-07

## 2022-04-07 ENCOUNTER — TELEPHONE (OUTPATIENT)
Dept: INTERNAL MEDICINE CLINIC | Facility: CLINIC | Age: 57
End: 2022-04-07

## 2022-04-07 RX ORDER — DOXYCYCLINE HYCLATE 100 MG/1
100 CAPSULE ORAL 2 TIMES DAILY
Qty: 14 CAPSULE | Refills: 0 | Status: SHIPPED | OUTPATIENT
Start: 2022-04-07

## 2022-04-07 NOTE — TELEPHONE ENCOUNTER
Patient called asking for pain medication for her right ear. Patient states she was seen in the office yesterday for ear pain and she is taking antibiotic and extra strength tylenol, but neither is helping her. Patient denies fever or drainage from ear. Patient has not tried ibuprofen.

## 2022-04-07 NOTE — PROGRESS NOTES
Dx: average risk crc screening  Colonoscopy with MAC sedation  Split dose golytely preparation, sent to pharmacy  Please review prep instructions with patient    ** If MAC @ EM/NE:                - HOLD ACE/ARBs the night before and/or the day of the procedure(s) - N/A               - NO alcohol, recreational drugs nor erectile dysfunction mediations 24 hours before procedure(s)                - NO herbal supplements or weight loss medications x 7 days prior to the procedure(s)

## 2022-04-07 NOTE — TELEPHONE ENCOUNTER
Changed the antibiotic to doxycycline   She can start as soon as she picks up the antibiotic  If her pain continue to be severe I agree with her going to the UC or ER.

## 2022-04-07 NOTE — TELEPHONE ENCOUNTER
How many doses of the zpak has she taken? She was started on antibiotics yesterday. If she feels like it is worsening I can change the antibiotic. Also she should try ibuprofen for discomfort.

## 2022-04-07 NOTE — TELEPHONE ENCOUNTER
Patient calling again regarding her right ear pain. States the pain is much worse and can feel the pain \"go up my scalp. \"  Patient requesting ear drops or another medication. Denies fever.

## 2022-04-07 NOTE — TELEPHONE ENCOUNTER
RN called patient. Patient's date of birth and full name both confirmed. RN informed patient of provider's message as detailed below. She verbalizes understanding and is agreeable to instructions.

## 2022-04-07 NOTE — TELEPHONE ENCOUNTER
[address]: 2 tablets last night, 1 this morning. No known allergies to medications or food. Patient called office. Patient's date of birth and full name both confirmed. Reporting severe pain. Asking for immediate relief. RN informed patient of provider's message as detailed below. She verbalizes understanding. However is upset because she had to wait all day long for a response. RN advised if severe pain, she should consider going to Immediate Care /ER.

## 2022-04-07 NOTE — PROGRESS NOTES
Dr. Mohit Rm,     Called patient for her scheduled telephone colon screening. Patient states she will do her research tonight regarding whom she would like her procedure done by--- states she does not want to choose blindly. Requested a [employer] message to be sent to her with the list of Md's within our practice. Message sent. PCP visit on 11/29/2021 and referred to GI for her first colonoscopy. CBC from 9/18/2021 resulted in Epic     Anticoagulants: no   Diabetic Meds:  no  BP meds(Ace inhibitors/ARB's): no  Weight loss meds (phentermine/vyvanse): no   Iron supplement (RX/OTC): no   Height & Weight/BMI: 5'4\"/175lbs/30  Hx of Cardiac/CVA issues/(MI/Stroke): no   Devices Pacemaker/Defibrillator/Stents: no   Resp. Issues/Oxygen Use/JULY/COPD: per pt. No to both  Issues w/Anesthesia: no    Symptoms (Y/N): no    Family history of colon cancer: no    Medications, pharmacy, and allergies verified with patient over the phone. Please advise on orders and prep, thank you.

## 2022-04-11 NOTE — PROGRESS NOTES
Spoke to patient. At this time she would liked to hold off on having a colonoscopy. She would like to ensure she is free of apnea or any other sleeping disorders prior to scheduling a colonoscopy. She is scheduled at [facility] sleep center on 4/27/2022. Patient states she will call our office when is she ready to proceed with scheduling colonoscopy. GI clinic number provided to patient. TE closed.

## 2022-04-27 ENCOUNTER — OFFICE VISIT (OUTPATIENT)
Dept: SLEEP CENTER | Age: 57
End: 2022-04-27
Attending: INTERNAL MEDICINE
Payer: COMMERCIAL

## 2022-04-27 DIAGNOSIS — G47.33 OBSTRUCTIVE SLEEP APNEA: ICD-10-CM

## 2022-04-27 PROCEDURE — 95806 SLEEP STUDY UNATT&RESP EFFT: CPT

## 2022-06-18 RX ORDER — LEVOTHYROXINE SODIUM 0.12 MG/1
TABLET ORAL
Qty: 90 TABLET | Refills: 0 | Status: SHIPPED | OUTPATIENT
Start: 2022-06-18

## 2022-06-24 ENCOUNTER — OFFICE VISIT (OUTPATIENT)
Dept: OTOLARYNGOLOGY | Facility: CLINIC | Age: 57
End: 2022-06-24
Payer: COMMERCIAL

## 2022-06-24 VITALS — WEIGHT: 175 LBS | HEIGHT: 64 IN | BODY MASS INDEX: 29.88 KG/M2

## 2022-06-24 DIAGNOSIS — G47.33 OBSTRUCTIVE SLEEP APNEA: Primary | ICD-10-CM

## 2022-06-24 DIAGNOSIS — J34.2 DEVIATED NASAL SEPTUM: ICD-10-CM

## 2022-06-24 PROCEDURE — 3008F BODY MASS INDEX DOCD: CPT | Performed by: OTOLARYNGOLOGY

## 2022-06-24 PROCEDURE — 99214 OFFICE O/P EST MOD 30 MIN: CPT | Performed by: OTOLARYNGOLOGY

## 2022-06-24 RX ORDER — ERGOCALCIFEROL 1.25 MG/1
CAPSULE ORAL
COMMUNITY
Start: 2022-06-22

## 2022-11-21 RX ORDER — LEVOTHYROXINE SODIUM 0.12 MG/1
125 TABLET ORAL
Qty: 90 TABLET | Refills: 1 | Status: SHIPPED | OUTPATIENT
Start: 2022-11-21

## 2022-11-21 RX ORDER — ERGOCALCIFEROL 1.25 MG/1
CAPSULE ORAL
Refills: 0 | OUTPATIENT
Start: 2022-11-21

## 2022-11-21 NOTE — TELEPHONE ENCOUNTER
Refill passed per [facility] protocol   Requested Prescriptions   Pending Prescriptions Disp Refills    levothyroxine 125 MCG Oral Tab 90 tablet 0     Sig: Take 1 tablet (125 mcg total) by mouth before breakfast.       Thyroid Medication Protocol Passed - 11/21/2022  7:38 AM        Passed - TSH in past 12 months        Passed - Last TSH value is normal     Lab Results   Component Value Date    TSH 1.410 04/06/2022                 Passed - In person appointment or virtual visit in the past 12 mos or appointment in next 3 mos     Recent Outpatient Visits              5 months ago Obstructive sleep apnea    [facility] [place], Garrison Germain MD    Office Visit    6 months ago Obstructive sleep apnea    [address]    Office Visit    7 months ago     [facility] [place] [facility]    Nurse Only    7 months ago Hypothyroidism, unspecified type    [address], [address] [facility] Eva Worley, Cynthia, FREDDIE    Office Visit    11 months ago Obstructive sleep apnea    [address] [facility] Charmayne Pea, MD    Office Visit          Future Appointments         Provider Department Appt Notes    In 1 week Peter De La Rosa MD [address] Thyroid check  last px 2015

## 2022-12-02 ENCOUNTER — OFFICE VISIT (OUTPATIENT)
Dept: INTERNAL MEDICINE CLINIC | Facility: CLINIC | Age: 57
End: 2022-12-02
Payer: COMMERCIAL

## 2022-12-02 VITALS
WEIGHT: 176 LBS | BODY MASS INDEX: 30.05 KG/M2 | SYSTOLIC BLOOD PRESSURE: 154 MMHG | HEIGHT: 64 IN | HEART RATE: 76 BPM | DIASTOLIC BLOOD PRESSURE: 92 MMHG

## 2022-12-02 DIAGNOSIS — Z12.31 VISIT FOR SCREENING MAMMOGRAM: ICD-10-CM

## 2022-12-02 DIAGNOSIS — Z00.00 ROUTINE GENERAL MEDICAL EXAMINATION AT A HEALTH CARE FACILITY: Primary | ICD-10-CM

## 2022-12-02 DIAGNOSIS — Z12.11 SCREENING FOR COLON CANCER: ICD-10-CM

## 2022-12-02 RX ADMIN — CYANOCOBALAMIN 1000 MCG: 1000 INJECTION, SOLUTION INTRAMUSCULAR; SUBCUTANEOUS at 11:57:00

## 2022-12-03 ENCOUNTER — LAB ENCOUNTER (OUTPATIENT)
Dept: LAB | Age: 57
End: 2022-12-03
Attending: INTERNAL MEDICINE
Payer: COMMERCIAL

## 2022-12-03 DIAGNOSIS — Z00.00 ROUTINE GENERAL MEDICAL EXAMINATION AT A HEALTH CARE FACILITY: ICD-10-CM

## 2022-12-03 LAB
ALBUMIN SERPL-MCNC: 3.8 G/DL (ref 3.4–5)
ALBUMIN/GLOB SERPL: 0.9 {RATIO} (ref 1–2)
ALP LIVER SERPL-CCNC: 96 U/L
ALT SERPL-CCNC: 64 U/L
ANION GAP SERPL CALC-SCNC: 4 MMOL/L (ref 0–18)
AST SERPL-CCNC: 30 U/L (ref 15–37)
BASOPHILS # BLD AUTO: 0.03 X10(3) UL (ref 0–0.2)
BASOPHILS NFR BLD AUTO: 0.8 %
BILIRUB SERPL-MCNC: 0.5 MG/DL (ref 0.1–2)
BILIRUB UR QL: NEGATIVE
BUN BLD-MCNC: 16 MG/DL (ref 7–18)
BUN/CREAT SERPL: 27.6 (ref 10–20)
CALCIUM BLD-MCNC: 9.2 MG/DL (ref 8.5–10.1)
CHLORIDE SERPL-SCNC: 108 MMOL/L (ref 98–112)
CHOLEST SERPL-MCNC: 214 MG/DL (ref ?–200)
CLARITY UR: CLEAR
CO2 SERPL-SCNC: 28 MMOL/L (ref 21–32)
COLOR UR: YELLOW
CREAT BLD-MCNC: 0.58 MG/DL
DEPRECATED RDW RBC AUTO: 41.6 FL (ref 35.1–46.3)
EOSINOPHIL # BLD AUTO: 0.04 X10(3) UL (ref 0–0.7)
EOSINOPHIL NFR BLD AUTO: 1 %
ERYTHROCYTE [DISTWIDTH] IN BLOOD BY AUTOMATED COUNT: 12.6 % (ref 11–15)
EST. AVERAGE GLUCOSE BLD GHB EST-MCNC: 108 MG/DL (ref 68–126)
FASTING PATIENT LIPID ANSWER: YES
FASTING STATUS PATIENT QL REPORTED: YES
GFR SERPLBLD BASED ON 1.73 SQ M-ARVRAT: 105 ML/MIN/1.73M2 (ref 60–?)
GLOBULIN PLAS-MCNC: 4.2 G/DL (ref 2.8–4.4)
GLUCOSE BLD-MCNC: 85 MG/DL (ref 70–99)
GLUCOSE UR-MCNC: NEGATIVE MG/DL
HBA1C MFR BLD: 5.4 % (ref ?–5.7)
HCT VFR BLD AUTO: 37.3 %
HDLC SERPL-MCNC: 68 MG/DL (ref 40–59)
HGB BLD-MCNC: 12.2 G/DL
IMM GRANULOCYTES # BLD AUTO: 0.01 X10(3) UL (ref 0–1)
IMM GRANULOCYTES NFR BLD: 0.3 %
KETONES UR-MCNC: NEGATIVE MG/DL
LDLC SERPL CALC-MCNC: 129 MG/DL (ref ?–100)
LEUKOCYTE ESTERASE UR QL STRIP.AUTO: NEGATIVE
LYMPHOCYTES # BLD AUTO: 1.13 X10(3) UL (ref 1–4)
LYMPHOCYTES NFR BLD AUTO: 28.3 %
MCH RBC QN AUTO: 30 PG (ref 26–34)
MCHC RBC AUTO-ENTMCNC: 32.7 G/DL (ref 31–37)
MCV RBC AUTO: 91.6 FL
MONOCYTES # BLD AUTO: 0.24 X10(3) UL (ref 0.1–1)
MONOCYTES NFR BLD AUTO: 6 %
NEUTROPHILS # BLD AUTO: 2.55 X10 (3) UL (ref 1.5–7.7)
NEUTROPHILS # BLD AUTO: 2.55 X10(3) UL (ref 1.5–7.7)
NEUTROPHILS NFR BLD AUTO: 63.6 %
NITRITE UR QL STRIP.AUTO: NEGATIVE
NONHDLC SERPL-MCNC: 146 MG/DL (ref ?–130)
OSMOLALITY SERPL CALC.SUM OF ELEC: 290 MOSM/KG (ref 275–295)
PH UR: 6 [PH] (ref 5–8)
PLATELET # BLD AUTO: 215 10(3)UL (ref 150–450)
POTASSIUM SERPL-SCNC: 3.8 MMOL/L (ref 3.5–5.1)
PROT SERPL-MCNC: 8 G/DL (ref 6.4–8.2)
RBC # BLD AUTO: 4.07 X10(6)UL
SODIUM SERPL-SCNC: 140 MMOL/L (ref 136–145)
SP GR UR STRIP: >=1.03 (ref 1–1.03)
T4 FREE SERPL-MCNC: 1.1 NG/DL (ref 0.8–1.7)
TRIGL SERPL-MCNC: 95 MG/DL (ref 30–149)
TSI SER-ACNC: 5.69 MIU/ML (ref 0.36–3.74)
UROBILINOGEN UR STRIP-ACNC: 0.2
VIT D+METAB SERPL-MCNC: 24.5 NG/ML (ref 30–100)
VLDLC SERPL CALC-MCNC: 17 MG/DL (ref 0–30)
WBC # BLD AUTO: 4 X10(3) UL (ref 4–11)

## 2022-12-03 PROCEDURE — 81015 MICROSCOPIC EXAM OF URINE: CPT

## 2022-12-03 PROCEDURE — 84443 ASSAY THYROID STIM HORMONE: CPT

## 2022-12-03 PROCEDURE — 80053 COMPREHEN METABOLIC PANEL: CPT

## 2022-12-03 PROCEDURE — 85025 COMPLETE CBC W/AUTO DIFF WBC: CPT

## 2022-12-03 PROCEDURE — 36415 COLL VENOUS BLD VENIPUNCTURE: CPT

## 2022-12-03 PROCEDURE — 82306 VITAMIN D 25 HYDROXY: CPT

## 2022-12-03 PROCEDURE — 81001 URINALYSIS AUTO W/SCOPE: CPT

## 2022-12-03 PROCEDURE — 83036 HEMOGLOBIN GLYCOSYLATED A1C: CPT

## 2022-12-03 PROCEDURE — 80061 LIPID PANEL: CPT

## 2022-12-03 PROCEDURE — 84439 ASSAY OF FREE THYROXINE: CPT

## 2023-01-15 RX ORDER — ERGOCALCIFEROL 1.25 MG/1
CAPSULE ORAL
Qty: 12 CAPSULE | Refills: 0 | Status: SHIPPED | OUTPATIENT
Start: 2023-01-15

## 2023-08-16 RX ORDER — LEVOTHYROXINE SODIUM 0.12 MG/1
125 TABLET ORAL
Qty: 90 TABLET | Refills: 1 | OUTPATIENT
Start: 2023-08-16

## 2023-08-16 RX ORDER — ERGOCALCIFEROL 1.25 MG/1
50000 CAPSULE ORAL WEEKLY
Qty: 4 CAPSULE | Refills: 0 | Status: SHIPPED | OUTPATIENT
Start: 2023-08-16

## 2023-08-16 RX ORDER — LEVOTHYROXINE SODIUM 0.12 MG/1
125 TABLET ORAL
Qty: 30 TABLET | Refills: 0 | Status: SHIPPED | OUTPATIENT
Start: 2023-08-16

## 2023-08-16 RX ORDER — ERGOCALCIFEROL 1.25 MG/1
50000 CAPSULE ORAL WEEKLY
Qty: 12 CAPSULE | Refills: 0 | OUTPATIENT
Start: 2023-08-16

## 2023-08-16 NOTE — TELEPHONE ENCOUNTER
Please call patient to make an appointment. [employer] message sent.   Thank you   Patient was to have FFup appt after 12/2/22

## 2023-08-16 NOTE — TELEPHONE ENCOUNTER
Future Appointments   Date Time Provider Sandra Montero   9/26/2023  4:00 PM Phoenix Chase MD [facility]

## 2023-08-16 NOTE — TELEPHONE ENCOUNTER
Please review. Protocol failed / Has no protocol. Requested Prescriptions   Pending Prescriptions Disp Refills    levothyroxine 125 MCG Oral Tab 90 tablet 0     Sig: Take 1 tablet (125 mcg total) by mouth before breakfast.       Thyroid Medication Protocol Failed - 8/14/2023  2:02 PM        Failed - Last TSH value is normal     Lab Results   Component Value Date    TSH 5.690 (H) 12/03/2022                 Passed - TSH in past 12 months        Passed - In person appointment or virtual visit in the past 12 mos or appointment in next 3 mos     Recent Outpatient Visits              8 months ago Routine general medical examination at a health care facility    Patrick Riggins MD    Office Visit    1 year ago Obstructive sleep apnea    Debra Guillen, [address], Yuli Germain MD    Office Visit    1 year ago Obstructive sleep apnea    [address]    Office Visit    1 year ago     Debra Guillen, [address], [place]    Nurse Only    1 year ago Hypothyroidism, unspecified type    [facility], Eva Amor, [place], APRETTA    Office Visit                        ergocalciferol 1.25 MG (22703 UT) Oral Cap 12 capsule 0     Sig: Take 1 capsule (50,000 Units total) by mouth once a week.        There is no refill protocol information for this order           Recent Outpatient Visits              8 months ago Routine general medical examination at a health care facility    Patrick Riggins MD    Office Visit    1 year ago Obstructive sleep apnea    [address] [facility], Yuli Germain MD    Office Visit    1 year ago Obstructive sleep apnea    [address]    Office Visit    1 year ago     Debra Guillen [address], [place]    Nurse Only    1 year ago Hypothyroidism, unspecified type    [address] Eva Haq, [place], APRN    Office Visit

## 2023-09-08 ENCOUNTER — HOSPITAL ENCOUNTER (OUTPATIENT)
Dept: MAMMOGRAPHY | Age: 58
Discharge: HOME | End: 2023-09-08
Attending: INTERNAL MEDICINE
Payer: COMMERCIAL

## 2023-09-08 DIAGNOSIS — Z12.31 VISIT FOR SCREENING MAMMOGRAM: ICD-10-CM

## 2023-09-08 PROCEDURE — 77063 BREAST TOMOSYNTHESIS BI: CPT | Performed by: INTERNAL MEDICINE

## 2023-09-08 PROCEDURE — 77067 SCR MAMMO BI INCL CAD: CPT | Performed by: INTERNAL MEDICINE

## 2023-09-08 RX ORDER — ERGOCALCIFEROL 1.25 MG/1
50000 CAPSULE ORAL WEEKLY
Qty: 4 CAPSULE | Refills: 0 | OUTPATIENT
Start: 2023-09-08

## 2023-09-09 NOTE — TELEPHONE ENCOUNTER
Duplicate request, previously addressed.     Requested Prescriptions   Pending Prescriptions Disp Refills    ERGOCALCIFEROL 1.25 MG (83563 UT) Oral Cap [Pharmacy Med Name: VITAMIN D2 50,000IU (ERGO) CAP RX] 4 capsule 0     Sig: TAKE 1 CAPSULE BY MOUTH 1 TIME A WEEK       There is no refill protocol information for this order

## 2023-09-10 RX ORDER — ERGOCALCIFEROL 1.25 MG/1
50000 CAPSULE ORAL
Qty: 3 CAPSULE | Refills: 3 | Status: SHIPPED | OUTPATIENT
Start: 2023-09-10

## 2023-10-16 RX ORDER — LEVOTHYROXINE SODIUM 0.12 MG/1
125 TABLET ORAL
Qty: 30 TABLET | Refills: 0 | OUTPATIENT
Start: 2023-10-16

## 2023-10-16 RX ORDER — LEVOTHYROXINE SODIUM 0.12 MG/1
125 TABLET ORAL
Qty: 90 TABLET | Refills: 0 | Status: SHIPPED | OUTPATIENT
Start: 2023-10-16

## 2023-10-16 NOTE — TELEPHONE ENCOUNTER
Appt 12/8/23  Please review. Protocol failed/ No protocol      Requested Prescriptions   Pending Prescriptions Disp Refills    levothyroxine 125 MCG Oral Tab 30 tablet 0     Sig: Take 1 tablet (125 mcg total) by mouth before breakfast.       Thyroid Medication Protocol Failed - 10/13/2023  5:51 PM        Failed - Last TSH value is normal     Lab Results   Component Value Date    TSH 5.690 (H) 12/03/2022                 Passed - TSH in past 12 months        Passed - In person appointment or virtual visit in the past 12 mos or appointment in next 3 mos     Recent Outpatient Visits              10 months ago Routine general medical examination at a health care facility    Tasneem Godinez MD    Office Visit    1 year ago Obstructive sleep apnea    [address], [address], Paul Germain MD    Office Visit    1 year ago Obstructive sleep apnea    [address]    Office Visit    1 year ago     [address], [address], [facility]    Nurse Only    1 year ago Hypothyroidism, unspecified type    [address], [address] [facility]    Office Visit          Future Appointments         Provider Department Appt Notes    In 1 month Tereso Schwab, MD [address], [address] [facility] management                           Future Appointments         Provider Department Appt Notes    In 1 month Tereso Schwab, MD [address], [facility] management             Recent Outpatient Visits              10 months ago Routine general medical examination at a health care facility    Tasneem Godinez MD    Office Visit    1 year ago Obstructive sleep apnea    [address], [address], Paul Germain MD Office Visit    1 year ago Obstructive sleep apnea    [address]    Office Visit    1 year ago     [address] [place], [address], [place]    Nurse Only    1 year ago Hypothyroidism, unspecified type    [address] [facility], APRN    Office Visit

## 2023-11-30 ENCOUNTER — PATIENT MESSAGE (OUTPATIENT)
Dept: INTERNAL MEDICINE CLINIC | Facility: CLINIC | Age: 58
End: 2023-11-30

## 2023-12-01 ENCOUNTER — EKG ENCOUNTER (OUTPATIENT)
Dept: LAB | Facility: HOSPITAL | Age: 58
End: 2023-12-01
Attending: INTERNAL MEDICINE
Payer: COMMERCIAL

## 2023-12-01 DIAGNOSIS — Z00.00 ROUTINE GENERAL MEDICAL EXAMINATION AT A HEALTH CARE FACILITY: ICD-10-CM

## 2023-12-01 PROCEDURE — 93010 ELECTROCARDIOGRAM REPORT: CPT | Performed by: INTERNAL MEDICINE

## 2023-12-01 PROCEDURE — 93005 ELECTROCARDIOGRAM TRACING: CPT

## 2023-12-04 LAB
ATRIAL RATE: 82 BPM
P AXIS: 55 DEGREES
P-R INTERVAL: 158 MS
Q-T INTERVAL: 388 MS
QRS DURATION: 102 MS
QTC CALCULATION (BEZET): 453 MS
R AXIS: 35 DEGREES
T AXIS: 42 DEGREES
VENTRICULAR RATE: 82 BPM

## 2023-12-08 ENCOUNTER — OFFICE VISIT (OUTPATIENT)
Dept: INTERNAL MEDICINE CLINIC | Facility: CLINIC | Age: 58
End: 2023-12-08
Payer: COMMERCIAL

## 2023-12-08 ENCOUNTER — MED REC SCAN ONLY (OUTPATIENT)
Dept: INTERNAL MEDICINE CLINIC | Facility: CLINIC | Age: 58
End: 2023-12-08

## 2023-12-08 VITALS
DIASTOLIC BLOOD PRESSURE: 85 MMHG | BODY MASS INDEX: 31.62 KG/M2 | SYSTOLIC BLOOD PRESSURE: 126 MMHG | HEART RATE: 70 BPM | OXYGEN SATURATION: 98 % | HEIGHT: 64 IN | WEIGHT: 185.19 LBS

## 2023-12-08 DIAGNOSIS — Z00.00 PHYSICAL EXAM: Primary | ICD-10-CM

## 2023-12-08 DIAGNOSIS — E53.8 VITAMIN B 12 DEFICIENCY: ICD-10-CM

## 2023-12-08 DIAGNOSIS — Z12.11 SCREENING FOR COLON CANCER: ICD-10-CM

## 2023-12-08 DIAGNOSIS — Z12.31 VISIT FOR SCREENING MAMMOGRAM: ICD-10-CM

## 2023-12-08 DIAGNOSIS — E04.2 MULTIPLE THYROID NODULES: ICD-10-CM

## 2023-12-08 DIAGNOSIS — Z12.4 SCREENING FOR CERVICAL CANCER: ICD-10-CM

## 2023-12-08 PROCEDURE — 90686 IIV4 VACC NO PRSV 0.5 ML IM: CPT | Performed by: INTERNAL MEDICINE

## 2023-12-08 PROCEDURE — 3008F BODY MASS INDEX DOCD: CPT | Performed by: INTERNAL MEDICINE

## 2023-12-08 PROCEDURE — 3074F SYST BP LT 130 MM HG: CPT | Performed by: INTERNAL MEDICINE

## 2023-12-08 PROCEDURE — 96372 THER/PROPH/DIAG INJ SC/IM: CPT | Performed by: INTERNAL MEDICINE

## 2023-12-08 PROCEDURE — 99396 PREV VISIT EST AGE 40-64: CPT | Performed by: INTERNAL MEDICINE

## 2023-12-08 PROCEDURE — 90471 IMMUNIZATION ADMIN: CPT | Performed by: INTERNAL MEDICINE

## 2023-12-08 PROCEDURE — 3079F DIAST BP 80-89 MM HG: CPT | Performed by: INTERNAL MEDICINE

## 2023-12-08 RX ORDER — CYANOCOBALAMIN 1000 UG/ML
1000 INJECTION, SOLUTION INTRAMUSCULAR; SUBCUTANEOUS
Status: SHIPPED | OUTPATIENT
Start: 2023-12-08

## 2023-12-08 RX ADMIN — CYANOCOBALAMIN 1000 MCG: 1000 INJECTION, SOLUTION INTRAMUSCULAR; SUBCUTANEOUS at 12:02:00

## 2023-12-09 ENCOUNTER — LAB ENCOUNTER (OUTPATIENT)
Dept: LAB | Age: 58
End: 2023-12-09
Attending: INTERNAL MEDICINE
Payer: COMMERCIAL

## 2023-12-09 DIAGNOSIS — Z00.00 PHYSICAL EXAM: ICD-10-CM

## 2023-12-09 LAB
ALBUMIN SERPL-MCNC: 4.2 G/DL (ref 3.2–4.8)
ALBUMIN/GLOB SERPL: 1.1 {RATIO} (ref 1–2)
ALP LIVER SERPL-CCNC: 75 U/L
ALT SERPL-CCNC: 55 U/L
ANION GAP SERPL CALC-SCNC: 4 MMOL/L (ref 0–18)
AST SERPL-CCNC: 34 U/L (ref ?–34)
BASOPHILS # BLD AUTO: 0.02 X10(3) UL (ref 0–0.2)
BASOPHILS NFR BLD AUTO: 0.5 %
BILIRUB SERPL-MCNC: 0.6 MG/DL (ref 0.3–1.2)
BILIRUB UR QL: NEGATIVE
BUN BLD-MCNC: 16 MG/DL (ref 9–23)
BUN/CREAT SERPL: 23.2 (ref 10–20)
CALCIUM BLD-MCNC: 9.7 MG/DL (ref 8.7–10.4)
CHLORIDE SERPL-SCNC: 107 MMOL/L (ref 98–112)
CHOLEST SERPL-MCNC: 237 MG/DL (ref ?–200)
CLARITY UR: CLEAR
CO2 SERPL-SCNC: 31 MMOL/L (ref 21–32)
COLOR UR: YELLOW
CREAT BLD-MCNC: 0.69 MG/DL
DEPRECATED RDW RBC AUTO: 42.6 FL (ref 35.1–46.3)
EGFRCR SERPLBLD CKD-EPI 2021: 101 ML/MIN/1.73M2 (ref 60–?)
EOSINOPHIL # BLD AUTO: 0.02 X10(3) UL (ref 0–0.7)
EOSINOPHIL NFR BLD AUTO: 0.5 %
ERYTHROCYTE [DISTWIDTH] IN BLOOD BY AUTOMATED COUNT: 12.9 % (ref 11–15)
EST. AVERAGE GLUCOSE BLD GHB EST-MCNC: 111 MG/DL (ref 68–126)
FASTING PATIENT LIPID ANSWER: YES
FASTING STATUS PATIENT QL REPORTED: YES
GLOBULIN PLAS-MCNC: 3.7 G/DL (ref 2.8–4.4)
GLUCOSE BLD-MCNC: 84 MG/DL (ref 70–99)
GLUCOSE UR-MCNC: NORMAL MG/DL
HBA1C MFR BLD: 5.5 % (ref ?–5.7)
HCT VFR BLD AUTO: 36.6 %
HDLC SERPL-MCNC: 75 MG/DL (ref 40–59)
HGB BLD-MCNC: 12.3 G/DL
IMM GRANULOCYTES # BLD AUTO: 0.01 X10(3) UL (ref 0–1)
IMM GRANULOCYTES NFR BLD: 0.3 %
KETONES UR-MCNC: NEGATIVE MG/DL
LDLC SERPL CALC-MCNC: 142 MG/DL (ref ?–100)
LEUKOCYTE ESTERASE UR QL STRIP.AUTO: NEGATIVE
LYMPHOCYTES # BLD AUTO: 1.28 X10(3) UL (ref 1–4)
LYMPHOCYTES NFR BLD AUTO: 35 %
MCH RBC QN AUTO: 30.4 PG (ref 26–34)
MCHC RBC AUTO-ENTMCNC: 33.6 G/DL (ref 31–37)
MCV RBC AUTO: 90.6 FL
MONOCYTES # BLD AUTO: 0.3 X10(3) UL (ref 0.1–1)
MONOCYTES NFR BLD AUTO: 8.2 %
NEUTROPHILS # BLD AUTO: 2.03 X10 (3) UL (ref 1.5–7.7)
NEUTROPHILS # BLD AUTO: 2.03 X10(3) UL (ref 1.5–7.7)
NEUTROPHILS NFR BLD AUTO: 55.5 %
NITRITE UR QL STRIP.AUTO: NEGATIVE
NONHDLC SERPL-MCNC: 162 MG/DL (ref ?–130)
OSMOLALITY SERPL CALC.SUM OF ELEC: 294 MOSM/KG (ref 275–295)
PH UR: 5.5 [PH] (ref 5–8)
PLATELET # BLD AUTO: 197 10(3)UL (ref 150–450)
POTASSIUM SERPL-SCNC: 4.3 MMOL/L (ref 3.5–5.1)
PROT SERPL-MCNC: 7.9 G/DL (ref 5.7–8.2)
PROT UR-MCNC: 50 MG/DL
RBC # BLD AUTO: 4.04 X10(6)UL
SODIUM SERPL-SCNC: 142 MMOL/L (ref 136–145)
SP GR UR STRIP: 1.03 (ref 1–1.03)
T4 FREE SERPL-MCNC: 1 NG/DL (ref 0.8–1.7)
TRIGL SERPL-MCNC: 115 MG/DL (ref 30–149)
TSI SER-ACNC: 11.18 MIU/ML (ref 0.55–4.78)
UROBILINOGEN UR STRIP-ACNC: NORMAL
VLDLC SERPL CALC-MCNC: 21 MG/DL (ref 0–30)
WBC # BLD AUTO: 3.7 X10(3) UL (ref 4–11)

## 2023-12-09 PROCEDURE — 84443 ASSAY THYROID STIM HORMONE: CPT

## 2023-12-09 PROCEDURE — 85025 COMPLETE CBC W/AUTO DIFF WBC: CPT

## 2023-12-09 PROCEDURE — 81001 URINALYSIS AUTO W/SCOPE: CPT

## 2023-12-09 PROCEDURE — 83036 HEMOGLOBIN GLYCOSYLATED A1C: CPT

## 2023-12-09 PROCEDURE — 80061 LIPID PANEL: CPT

## 2023-12-09 PROCEDURE — 36415 COLL VENOUS BLD VENIPUNCTURE: CPT

## 2023-12-09 PROCEDURE — 84439 ASSAY OF FREE THYROXINE: CPT

## 2023-12-09 PROCEDURE — 80053 COMPREHEN METABOLIC PANEL: CPT

## 2023-12-11 ENCOUNTER — PATIENT MESSAGE (OUTPATIENT)
Dept: INTERNAL MEDICINE CLINIC | Facility: CLINIC | Age: 58
End: 2023-12-11

## 2023-12-11 RX ORDER — LEVOTHYROXINE SODIUM 137 UG/1
137 TABLET ORAL
Qty: 90 TABLET | Refills: 0 | Status: SHIPPED | OUTPATIENT
Start: 2023-12-11

## 2023-12-12 NOTE — TELEPHONE ENCOUNTER
From: Negin Glez  To: Shona Angel  Sent: 12/11/2023 2:54 PM CST  Subject: Colonoscopy     Please provide a referral for a colonoscopy. Thank you.

## 2023-12-12 NOTE — TELEPHONE ENCOUNTER
From: Vicki Westbrook  To: Malka Nguyenr  Sent: 12/11/2023 2:41 PM CST  Subject: Fecal occult blood test    Xin Griffith! I decided not to take the fecal occult blood test. I would rather have an accurate test made by going thru a colonoscopy. Please remove the fecal occult blood patient kit from my billing. I can bring back the kit you gave me back to your office this week. Thank you.

## 2023-12-18 ENCOUNTER — HOSPITAL ENCOUNTER (OUTPATIENT)
Dept: ULTRASOUND IMAGING | Age: 58
Discharge: HOME | End: 2023-12-18
Attending: INTERNAL MEDICINE
Payer: COMMERCIAL

## 2023-12-18 DIAGNOSIS — E04.2 MULTIPLE THYROID NODULES: ICD-10-CM

## 2023-12-18 PROCEDURE — 76536 US EXAM OF HEAD AND NECK: CPT | Performed by: INTERNAL MEDICINE

## 2023-12-20 ENCOUNTER — NURSE ONLY (OUTPATIENT)
Facility: CLINIC | Age: 58
End: 2023-12-20

## 2023-12-20 DIAGNOSIS — Z12.11 COLON CANCER SCREENING: Primary | ICD-10-CM

## 2023-12-20 NOTE — PROGRESS NOTES
Scheduled for:  Colonoscopy [place]  Provider Name:  Dr. Rody Lane  Date:  1/18/2024  Location:  [facility]  Sedation:  IV  Time:  12:30 PM (patient is aware arrival time is 11:30 AM)   Prep:  Golytely  Meds/Allergies Reconciled?:  Nurse reviewed      Diagnosis with codes:  Colon cancer screening Z12.11  Was patient informed to call insurance with codes (Y/N):  Yes, I confirmed [id number] [facility] insurance with this patient. Referral sent?:  Referral was sent at the time of electronic surgical scheduling. [address] [place] [address] or [facility]  notified?:  I sent an electronic request to Endo Scheduling and received a confirmation today. Medication Orders:  N/A  Misc Orders:  N/A     Further instructions given by staff:  I discussed the prep instructions with the patient which she verbally understood and is aware that I will send the instructions today.

## 2023-12-20 NOTE — PROGRESS NOTES
GI Staff:  [facility] Colon Screening Orders    Please schedule: Colonoscopy 19569 / [id number] with MAC OR IV (if appropriate)    Please send split dose Golytely bowel prep     Diagnosis: Colon Screening Z12.11 /     >>>Please inform patient if new medications are started after scheduling procedure they need to call clinic to notify us.

## 2023-12-20 NOTE — PROGRESS NOTES
GI RNS:    Please send prep to patient's pharmacy on file - patient is scheduled for a colonoscopy on 1/18/2024. Thank you!     Sonia Villalba

## 2024-01-01 ENCOUNTER — HOSPITAL ENCOUNTER (OUTPATIENT)
Age: 59
Discharge: HOME | End: 2024-01-01
Attending: EMERGENCY MEDICINE
Payer: COMMERCIAL

## 2024-01-01 VITALS
SYSTOLIC BLOOD PRESSURE: 146 MMHG | HEART RATE: 82 BPM | TEMPERATURE: 98 F | RESPIRATION RATE: 16 BRPM | DIASTOLIC BLOOD PRESSURE: 86 MMHG | OXYGEN SATURATION: 98 %

## 2024-01-01 DIAGNOSIS — H92.02 EARACHE ON LEFT: ICD-10-CM

## 2024-01-01 DIAGNOSIS — J06.9 UPPER RESPIRATORY TRACT INFECTION, UNSPECIFIED TYPE: Primary | ICD-10-CM

## 2024-01-01 LAB — S PYO AG THROAT QL IA.RAPID: NEGATIVE

## 2024-01-01 PROCEDURE — 99212 OFFICE O/P EST SF 10 MIN: CPT

## 2024-01-01 PROCEDURE — 87651 STREP A DNA AMP PROBE: CPT | Performed by: EMERGENCY MEDICINE

## 2024-01-01 PROCEDURE — 99203 OFFICE O/P NEW LOW 30 MIN: CPT

## 2024-01-01 NOTE — DISCHARGE INSTRUCTIONS
Thank you for visiting our immediate care for your health care needs.  Please follow up with your regular doctor in the next 1-2 days.  If you have any additional problems please return to the immediate care.  Please take ibuprofen 600 mg along with Tylenol 1 g 3 times a day for pain.

## 2024-01-01 NOTE — ED PROVIDER NOTES
Patient Seen in: [facility]      History     Chief Complaint   Patient presents with    Sore Throat    Ear Pain     Stated Complaint: Ear Problem Pain    Subjective:   HPI    58-year-old female presents today for evaluation sore throat runny nose left ear pain.  Patient reports she had a sore throat over the past week.  Left ear pain started 3 days ago.  Does wear hearing aid in her left ear.  No fevers or chills.  Minimal cough.  No vomiting or diarrhea.  Symptoms are acute mild.    Objective:   Past Medical History:   Diagnosis Date    Cyst of left breast 2009    drainage    Primary hypothyroidism               Past Surgical History:   Procedure Laterality Date    CYST ASPIRATION LEFT                  Social History     Socioeconomic History    Marital status:    Tobacco Use    Smoking status: Never    Smokeless tobacco: Never   Vaping Use    Vaping Use: Never used   Substance and Sexual Activity    Alcohol use: No    Drug use: Never   Other Topics Concern    Caffeine Concern No              Review of Systems    Positive for stated complaint: Ear Problem Pain  Other systems are as noted in HPI.  Constitutional and vital signs reviewed.      All other systems reviewed and negative except as noted above.    Physical Exam     ED Triage Vitals [01/01/24 0853]   /86   Pulse 82   Resp 16   Temp 97.8 °F (36.6 °C)   Temp src Temporal   SpO2 98 %   O2 Device None (Room air)       Current:/86   Pulse 82   Temp 97.8 °F (36.6 °C) (Temporal)   Resp 16   LMP 01/01/2012 (Approximate)   SpO2 98%         Physical Exam  Vitals reviewed.   Constitutional:       General: She is not in acute distress.     Appearance: Normal appearance. She is not ill-appearing or toxic-appearing.   HENT:      Head: Normocephalic and atraumatic.      Right Ear: Tympanic membrane normal.      Left Ear: Tympanic membrane normal.      Mouth/Throat:      Mouth: Mucous membranes are moist.      Pharynx: Oropharynx is  clear. No pharyngeal swelling, oropharyngeal exudate or posterior oropharyngeal erythema.   Eyes:      Extraocular Movements: Extraocular movements intact.      Conjunctiva/sclera: Conjunctivae normal.   Cardiovascular:      Rate and Rhythm: Normal rate and regular rhythm.   Pulmonary:      Effort: Pulmonary effort is normal.      Breath sounds: Normal breath sounds.   Musculoskeletal:      Cervical back: Neck supple.   Skin:     General: Skin is warm and dry.   Neurological:      Mental Status: She is alert and oriented to person, place, and time.   Psychiatric:         Mood and Affect: Mood normal.               ED Course     Labs Reviewed   RAPID STREP A - Normal                      MDM        58 year old female with viral symptoms left ear pain.  No sign of ear infection.  Will check strep.    Differential diagnosis (including but not limited to):  Strep pharyngitis, viral pharyngitis, other viral syndrome    ED course:  Pulse Ox: 98% on room air which is normal      Comment: Please note this report has been produced using speech recognition software and may contain errors related to that system including errors in grammar, punctuation, and spelling, as well as words and phrases that may be inappropriate. If there are any questions or concerns please feel free to contact the dictating provider for clarification.                                     Medical Decision Making      Disposition and Plan     Clinical Impression:  1. Upper respiratory tract infection, unspecified type    2. Earache on left         Disposition:  Discharge  1/1/2024  9:20 am    Follow-up:  Erickson Santamaria MD  [address] [place] [address] [phone]    Schedule an appointment as soon as possible for a visit       Serina Interiano MD  [address] [address] [phone]    Schedule an appointment as soon as possible for a visit   This is an ear nose and throat specialist.          Medications  Prescribed:  Current Discharge Medication List

## 2024-01-09 ENCOUNTER — OFFICE VISIT (OUTPATIENT)
Dept: OBGYN CLINIC | Facility: CLINIC | Age: 59
End: 2024-01-09
Payer: COMMERCIAL

## 2024-01-09 VITALS
WEIGHT: 180 LBS | DIASTOLIC BLOOD PRESSURE: 82 MMHG | SYSTOLIC BLOOD PRESSURE: 123 MMHG | HEART RATE: 88 BPM | BODY MASS INDEX: 31.89 KG/M2 | HEIGHT: 63 IN

## 2024-01-09 DIAGNOSIS — Z12.31 ENCOUNTER FOR SCREENING MAMMOGRAM FOR BREAST CANCER: ICD-10-CM

## 2024-01-09 DIAGNOSIS — Z01.419 ENCOUNTER FOR GYNECOLOGICAL EXAMINATION: Primary | ICD-10-CM

## 2024-01-09 DIAGNOSIS — Z11.3 SCREEN FOR STD (SEXUALLY TRANSMITTED DISEASE): ICD-10-CM

## 2024-01-09 DIAGNOSIS — Z12.4 SCREENING FOR CERVICAL CANCER: ICD-10-CM

## 2024-01-09 PROCEDURE — 3074F SYST BP LT 130 MM HG: CPT | Performed by: OBSTETRICS & GYNECOLOGY

## 2024-01-09 PROCEDURE — 3079F DIAST BP 80-89 MM HG: CPT | Performed by: OBSTETRICS & GYNECOLOGY

## 2024-01-09 PROCEDURE — 99386 PREV VISIT NEW AGE 40-64: CPT | Performed by: OBSTETRICS & GYNECOLOGY

## 2024-01-09 PROCEDURE — 3008F BODY MASS INDEX DOCD: CPT | Performed by: OBSTETRICS & GYNECOLOGY

## 2024-01-10 LAB
C TRACH DNA SPEC QL NAA+PROBE: NEGATIVE
HPV I/H RISK 1 DNA SPEC QL NAA+PROBE: NEGATIVE
N GONORRHOEA DNA SPEC QL NAA+PROBE: NEGATIVE
T VAGINALIS RRNA SPEC QL NAA+PROBE: NEGATIVE

## 2024-01-10 NOTE — PROGRESS NOTES
Apple Craig is a 58 year old female  Patient's last menstrual period was 2012 (approximate). here for annual exam.         New pt.    Last mammogram 2023.  Last pap     LMP .  No hot flashes.  Wants STD screen    No gyne issues.        OBSTETRICS HISTORY:  OB History    Para Term  AB Living   1 1 1 0 0 1   SAB IAB Ectopic Multiple Live Births   0 0 0 0 1       GYNE HISTORY   Pap Date: 02/27/15  Pap Result Notes: NEG PAP / NEG HPV  Follow Up Recommendation: MAMMO BILATERAL 23 BENIGN    MEDICAL HISTORY:  Past Medical History:   Diagnosis Date    Cyst of left breast 2009    drainage    Primary hypothyroidism      Past Surgical History:   Procedure Laterality Date    CYST ASPIRATION LEFT         SOCIAL HISTORY:  Social History     Socioeconomic History    Marital status:    Tobacco Use    Smoking status: Never    Smokeless tobacco: Never   Vaping Use    Vaping Use: Never used   Substance and Sexual Activity    Alcohol use: No    Drug use: Never   Other Topics Concern    Caffeine Concern No       FAMILY HISTORY:  Family History   Problem Relation Age of Onset    Diabetes Father     Breast Cancer Maternal Grandmother 60    Hypertension Paternal Grandmother     Breast Cancer Maternal Cousin Female 45       MEDICATIONS:  Current Outpatient Medications   Medication Sig Dispense Refill    levothyroxine 137 MCG Oral Tab Take 137 mcg by mouth before breakfast. 90 tablet 0    ergocalciferol 1.25 MG (74983 UT) Oral Cap Take 1 capsule (50,000 Units total) by mouth every 30 (thirty) days. 3 capsule 3       ALLERGIES:  No Known Allergies      Depression Screening (PHQ-2/PHQ-9): Over the LAST 2 WEEKS   Little interest or pleasure in doing things: Not at all    Feeling down, depressed, or hopeless: Not at all    PHQ-2 SCORE: 0           Review of Systems:  Constitutional:  Denies fatigue, night sweats, hot flashes  Eyes:  denies blurred or double vision  Cardiovascular:  denies  chest pain or palpitations  Respiratory:  denies shortness of breath  Gastrointestinal:  denies heartburn, abdominal pain, diarrhea or constipation  Genitourinary:  denies dysuria, incontinence, abnormal vaginal discharge, vaginal itching  Musculoskeletal:  denies back pain.  Skin/Breast:  Denies any breast pain, lumps, or discharge.   Neurological:  denies headaches, extremity weakness or numbness.  Psychiatric: denies depression or anxiety.  Endocrine:   denies excessive thirst or urination.  Heme/Lymph:  easy bruising or bleeding.    PHYSICAL EXAM:   /82   Pulse 88   Ht 5' 3\" (1.6 m)   Wt 180 lb (81.6 kg)   LMP 01/01/2012 (Approximate)   BMI 31.89 kg/m²   Wt Readings from Last 2 Encounters:   01/09/24 180 lb (81.6 kg)   12/08/23 185 lb 3.2 oz (84 kg)     Body mass index is 31.89 kg/m².    Constitutional: well developed, well nourished  Neck/Thyroid: thyroid symmetric, no nodules  Heart:  Regular rate and rhythm  Lungs:  Clear to asculation  Breast: normal without palpable masses, tenderness, asymmetry, nipple discharge, nipple retraction or skin changes  Abdomen:  soft, nontender, nondistended, no masses  Psychiatric:  Oriented to time, place, person and situation. Appropriate mood and affect    Pelvic Exam:  External Genitalia: normal appearance, hair distribution, and no lesions  Urethral Meatus:  normal in size, location, without lesions and prolapse  Bladder:  No fullness, masses or tenderness  Vagina:  Normal appearance without lesions, no abnormal discharge  Cervix:  Normal without tenderness on motion  Uterus: normal in size, contour, position, mobility, without tenderness  Adnexa: normal without masses or tenderness  Perineum/anus: normal      Assessment & Plan:    Apple Craig is a 58 year old female who presents for an annual physical exam.    1. Encounter for gynecological examination  Pap and HPV.   Will do Pap/HPV q 3 years.   Annual exams encouraged.  Order for mammogram to be done  9/2024.    RTC 1 year or prn     2. Encounter for screening mammogram for breast cancer  - [facility] 2D+3D SCREENING BILAT (CPT=77067/40609); Future    3. Screen for STD (sexually transmitted disease)  Gc/chlamydia/trichomonas.         Requested Prescriptions      No prescriptions requested or ordered in this encounter         Stacia Fountain MD  1/10/2024  12:58 PM

## 2024-01-17 ENCOUNTER — TELEPHONE (OUTPATIENT)
Facility: CLINIC | Age: 59
End: 2024-01-17

## 2024-01-17 LAB — LAST PAP RESULT: NORMAL

## 2024-01-17 NOTE — TELEPHONE ENCOUNTER
Spoke to patient    Pt denied receiving bowel prep instructions  I sent her via [employer] and went through them with her    Answered her questions regarding the bowel prep, arrival time, and acceptable liquids.    Pt verbalized understanding and had no further questions.  Let her know to call back if she comes up with any other questions

## 2024-01-18 ENCOUNTER — HOSPITAL ENCOUNTER (OUTPATIENT)
Facility: HOSPITAL | Age: 59
Setting detail: HOSPITAL OUTPATIENT SURGERY
Discharge: HOME | End: 2024-01-18
Attending: INTERNAL MEDICINE | Admitting: INTERNAL MEDICINE
Payer: COMMERCIAL

## 2024-01-18 VITALS
HEIGHT: 64 IN | BODY MASS INDEX: 31.58 KG/M2 | OXYGEN SATURATION: 99 % | HEART RATE: 67 BPM | RESPIRATION RATE: 11 BRPM | WEIGHT: 185 LBS | DIASTOLIC BLOOD PRESSURE: 74 MMHG | SYSTOLIC BLOOD PRESSURE: 102 MMHG

## 2024-01-18 DIAGNOSIS — Z12.11 COLON CANCER SCREENING: ICD-10-CM

## 2024-01-18 PROCEDURE — 0DBL8ZX EXCISION OF TRANSVERSE COLON, VIA NATURAL OR ARTIFICIAL OPENING ENDOSCOPIC, DIAGNOSTIC: ICD-10-PCS | Performed by: INTERNAL MEDICINE

## 2024-01-18 PROCEDURE — G0500 MOD SEDAT ENDO SERVICE >5YRS: HCPCS | Performed by: INTERNAL MEDICINE

## 2024-01-18 PROCEDURE — 45385 COLONOSCOPY W/LESION REMOVAL: CPT | Performed by: INTERNAL MEDICINE

## 2024-01-18 RX ORDER — SODIUM CHLORIDE, SODIUM LACTATE, POTASSIUM CHLORIDE, CALCIUM CHLORIDE 600; 310; 30; 20 MG/100ML; MG/100ML; MG/100ML; MG/100ML
INJECTION, SOLUTION INTRAVENOUS CONTINUOUS
Status: DISCONTINUED | OUTPATIENT
Start: 2024-01-18 | End: 2024-01-18

## 2024-01-18 RX ORDER — MIDAZOLAM HYDROCHLORIDE 1 MG/ML
INJECTION INTRAMUSCULAR; INTRAVENOUS
Status: DISCONTINUED | OUTPATIENT
Start: 2024-01-18 | End: 2024-01-18

## 2024-01-18 RX ORDER — MIDAZOLAM HYDROCHLORIDE 1 MG/ML
1 INJECTION INTRAMUSCULAR; INTRAVENOUS EVERY 5 MIN PRN
Status: DISCONTINUED | OUTPATIENT
Start: 2024-01-18 | End: 2024-01-18

## 2024-01-18 RX ORDER — SODIUM CHLORIDE 0.9 % (FLUSH) 0.9 %
10 SYRINGE (ML) INJECTION AS NEEDED
Status: DISCONTINUED | OUTPATIENT
Start: 2024-01-18 | End: 2024-01-18

## 2024-01-18 RX ORDER — NALOXONE HYDROCHLORIDE 0.4 MG/ML
0.08 INJECTION, SOLUTION INTRAMUSCULAR; INTRAVENOUS; SUBCUTANEOUS ONCE AS NEEDED
Status: DISCONTINUED | OUTPATIENT
Start: 2024-01-18 | End: 2024-01-18

## 2024-01-18 NOTE — H&P
History & Physical Examination    Patient Name: Apple Craig  MRN: [id number]  CSN: [id number]  YOB: 1965    Diagnosis: crc screening    Facility-Administered Medications Prior to Admission   Medication Dose Route Frequency Provider Last Rate Last Admin    cyanocobalamin (Vitamin B12) 1000 MCG/ML injection 1,000 mcg  1,000 mcg Intramuscular Q30 Days Erickson Santamaria MD   1,000 mcg at 23 1202     Medications Prior to Admission   Medication Sig Dispense Refill Last Dose    [] polyethylene glycol, PEG 3350-KCl-NaBcb-NaCl-NaSulf, 236 g Oral Recon Soln Take 4,000 mL by mouth once for 1 dose. 4000 mL 0     [] polyethylene glycol, PEG 3350-KCl-NaBcb-NaCl-NaSulf, 236 g Oral Recon Soln Take 4,000 mL by mouth once for 1 dose. 4000 mL 0     levothyroxine 137 MCG Oral Tab Take 137 mcg by mouth before breakfast. 90 tablet 0     ergocalciferol 1.25 MG (29578 UT) Oral Cap Take 1 capsule (50,000 Units total) by mouth every 30 (thirty) days. 3 capsule 3      No current facility-administered medications for this encounter.       Allergies: No Known Allergies    Past Medical History:   Diagnosis Date    Cyst of left breast 2009    drainage    Disorder of thyroid     Primary hypothyroidism     Sleep apnea      Past Surgical History:   Procedure Laterality Date    CYST ASPIRATION LEFT       Family History   Problem Relation Age of Onset    Diabetes Father     Breast Cancer Maternal Grandmother 60    Hypertension Paternal Grandmother     Breast Cancer Maternal Cousin Female 45     Social History     Tobacco Use    Smoking status: Never    Smokeless tobacco: Never   Substance Use Topics    Alcohol use: No         SYSTEM Check if Review is Normal Check if Physical Exam is Normal If not normal, please explain:   HEENT [X ] [ X]    NECK  [X ] [ X]    HEART [X ] [ X]    LUNGS [X ] [ X]    ABDOMEN [X ] [ X]    EXTREMITIES [X ] [ X]    OTHER        I have discussed the risks and benefits and  alternatives of the procedure with the patient/family.  They understand and agree to proceed with plan of care.   I have reviewed the History and Physical done within the last 30 days.  Any changes noted above.    Denise Duarte MD  [facility] - Gastroenterology  1/18/2024  11:45 AM

## 2024-01-18 NOTE — DISCHARGE INSTRUCTIONS
Home Care Instructions for Colonoscopy and/or Gastroscopy with Sedation    Diet:  - Resume your regular diet as tolerated unless otherwise instructed.  - Start with light meals to minimize bloating.  - Do not drink alcohol today.    Medication:  - If you have questions about resuming your normal medications, please contact your Primary Care Physician.    Activities:  - Take it easy today. Do not return to work today.  - Do not drive today.  - Do not operate any machinery today (including kitchen equipment).    Colonoscopy:  - You may notice some rectal \"spotting\" (a little blood on the toilet tissue) for a day or two after the exam. This is normal.  - If you experience any rectal bleeding (not spotting), persistent tenderness or sharp severe abdominal pains, oral temperature over 100 degrees Fahrenheit, light-headedness or dizziness, or any other problems, contact your doctor.    Gastroscopy:  - You may have a sore throat for 2-3 days following the exam. This is normal. Gargling with warm salt water (1/2 tsp salt to 1 glass warm water) or using throat lozenges will help.  - If you experience any sharp pain in your neck, abdomen or chest, vomiting of blood, oral temperature over 100 degrees Fahrenheit, light-headedness or dizziness, or any other problems, contact your doctor.    **If unable to reach your doctor, please go to the [facility] Emergency Room**    - Your referring physician will receive a full report of your examination.  - If you do not hear from your doctor's office within two weeks of your biopsy, please call them for your results.    You may be able to see your laboratory results in [employer] between 4 and 7 business days.  In some cases, your physician may not have viewed the results before they are released to [employer].  If you have questions regarding your results contact the physician who ordered the test/exam by phone or via [employer] by choosing \"Ask a Medical Question.\"

## 2024-01-18 NOTE — OPERATIVE REPORT
COLONOSCOPY REPORT    Apple Craig     1965 Age 58 year old   PCP Erickson Santamaria MD Endoscopist Denise Duarte MD     Date of procedure: 24    Procedure: Colonoscopy w/snare polypectomy    Pre-operative diagnosis: screening    Post-operative diagnosis: see impression    Medications: Medications: Versed 6 mg IV push.  Fentanyl 100 mcg IV push. [Per my order and under my supervision, the patient was sedated with intermittent intravenous doses of versed and fentanyl. The vital signs were monitored and recorded by an experienced RN. The procedure started after the patient was adequately sedated. Total time for moderate conscious sedation was 20 minutes].      Withdrawal time: 13 minutes    Procedure:  Informed consent was obtained from the patient after the risks of the procedure were discussed, including but not limited to bleeding, perforation, aspiration, infection, or possibility of a missed lesion. After discussions of the risks/benefits and alternatives to this procedure, as well as the planned sedation, the patient was placed in the left lateral decubitus position and begun on continuous blood pressure pulse oximetry and EKG monitoring and this was maintained throughout the procedure. Once an adequate level of sedation was obtained a digital rectal exam was completed. Then the lubricated tip of the [id number] diagnostic video colonoscope was inserted and advanced without difficulty to the cecum using the CO2 insufflation technique. The cecum was identified by localizing the trifold, the appendix and the ileocecal valve. Withdrawal was begun with thorough washing and careful examination of the colonic walls and folds. A routine second examination of the cecum/ascending colon was performed. Retroflexion was performed in the rectum. Photodocumentation was obtained. Bristol bowel prep score of 9 (Right colon-3; Transverse colon-3, Left colon-3). I then carefully withdrew the instrument from the  patient who tolerated the procedure well.     Complications: none.    Findings:   -- EDMOND: normal rectal tone, no masses palpated.     -- Terminal ileum: the visualized mucosa appeared normal.    -- 1 polyp(s) noted as follows:      A. 4 mm polyp in the hepatic flexure colon; sessile morphology; cold snare polypectomy and retrieved.       -- Diverticulosis: moderate in the sigmoid colon.    -- A retroflexed view of the rectum revealed no abnormalities.    -- The colonic mucosa throughout the colon showed normal vascular pattern, without evidence of angioectasias or inflammation.     Impression:   One small polyp resected and retrieved   diverticulosis    Recommend:  Pending pathology, repeat colonoscopy in 5 years.  High fiber diet    >>>If tissue was obtained and you have not received your pathology results either by phone or letter within 2 weeks, please call our office at [phone].    Specimens: polyp     Blood loss: <1 ml      Denise Duarte MD  [facility] Gastroenterology

## 2024-01-20 NOTE — PROGRESS NOTES
Recall 5 years   Dear Apple,    I reviewed the pathology report from the polyp(s) we completely removed during your recent colonoscopy. The polyp removed was an adenoma, which is a benign growth. However, these are the types of polyps that if not removed could become a colon cancer. All of your polyps were completely removed.     The current health care guidelines recommend that patients with the size, number, and types of polyps you have should repeat their colonoscopy in 5 years to look for any new polyps that might have grown.    If you have further questions please call me at [phone] or message me through [employer].    Sincerely,   Denise Duarte MD

## 2024-01-22 ENCOUNTER — TELEPHONE (OUTPATIENT)
Facility: CLINIC | Age: 59
End: 2024-01-22

## 2024-01-23 NOTE — TELEPHONE ENCOUNTER
Recall colonoscopy in 5 years per Dr Duarte.    Colon done 1/18/2024  Health maintenance updated and message sent to patient outreach to repeat colonoscopy in 5  years    Results seen by patient via mychart  Seen by patient Apple Craig on 1/20/2024  9:09 AM

## 2024-01-23 NOTE — TELEPHONE ENCOUNTER
----- Message from Denise Banda Ma, MD sent at 1/20/2024  9:08 AM CST -----  Recall 5 years   Dear Apple,    I reviewed the pathology report from the polyp(s) we completely removed during your recent colonoscopy. The polyp removed was an adenoma, which is a benign growth. However, these are the types of polyps that if not removed could become a colon cancer. All of your polyps were completely removed.     The current health care guidelines recommend that patients with the size, number, and types of polyps you have should repeat their colonoscopy in 5 years to look for any new polyps that might have grown.    If you have further questions please call me at [phone] or message me through [employer].    Sincerely,   Denise Duarte MD

## 2024-01-29 ENCOUNTER — MED REC SCAN ONLY (OUTPATIENT)
Facility: CLINIC | Age: 59
End: 2024-01-29

## 2024-02-06 ENCOUNTER — NURSE TRIAGE (OUTPATIENT)
Dept: INTERNAL MEDICINE CLINIC | Facility: CLINIC | Age: 59
End: 2024-02-06

## 2024-02-06 NOTE — TELEPHONE ENCOUNTER
Action Requested: Summary for Provider     []  Critical Lab, Recommendations Needed  [] Need Additional Advice  []   FYI    []   Need Orders  [] Need Medications Sent to Pharmacy  []  Other     SUMMARY: Per protocol: OV    Future Appointments   Date Time Provider Department Center   2/13/2024  1:40 PM Erickson Santamaria MD [facility]     Reason for call: Weight Loss  Onset: Data Unavailable    Patient was seen in December. She mentioned Dr. Santamaria that she has been gaining weight and its effecting her health. She would like to speak to Dr. Santamaria about starting Ozempic.     Reason for Disposition   Nursing judgment    Protocols used: No Protocol [id number]

## 2024-02-06 NOTE — TELEPHONE ENCOUNTER
Reason for Disposition  • Concerns about gaining too much weight or obesity    Protocols used: Eating [id number]-O-PV

## 2024-02-13 ENCOUNTER — OFFICE VISIT (OUTPATIENT)
Dept: INTERNAL MEDICINE CLINIC | Facility: CLINIC | Age: 59
End: 2024-02-13
Payer: COMMERCIAL

## 2024-02-13 VITALS
DIASTOLIC BLOOD PRESSURE: 84 MMHG | HEART RATE: 83 BPM | WEIGHT: 180 LBS | SYSTOLIC BLOOD PRESSURE: 126 MMHG | HEIGHT: 63 IN | BODY MASS INDEX: 31.89 KG/M2

## 2024-02-13 DIAGNOSIS — E66.09 CLASS 1 OBESITY DUE TO EXCESS CALORIES WITHOUT SERIOUS COMORBIDITY WITH BODY MASS INDEX (BMI) OF 31.0 TO 31.9 IN ADULT: ICD-10-CM

## 2024-02-13 DIAGNOSIS — E03.9 HYPOTHYROIDISM, UNSPECIFIED TYPE: Primary | Chronic | ICD-10-CM

## 2024-02-13 PROCEDURE — 99213 OFFICE O/P EST LOW 20 MIN: CPT | Performed by: INTERNAL MEDICINE

## 2024-02-13 RX ORDER — SEMAGLUTIDE 0.68 MG/ML
0.25 INJECTION, SOLUTION SUBCUTANEOUS WEEKLY
Qty: 4 EACH | Refills: 1 | Status: SHIPPED | OUTPATIENT
Start: 2024-02-13

## 2024-02-13 NOTE — PATIENT INSTRUCTIONS
Ozempic 0.25 mg weekly x 2 weeks, then 0.5 mg weekly assuming patient  does not side effect  No personal or family history of MEN syndrome  Patient counselled regarding side effects including injection site reactions, nausea, vomiting, diarrhea, pancreatitis, gastroparesis and rare side effect kusum Isac syndrome.     Temporary worsening of retinopathy with ozempic. May occur  Patient voiced understanding  and agrees with plan

## 2024-02-14 ENCOUNTER — TELEPHONE (OUTPATIENT)
Dept: INTERNAL MEDICINE CLINIC | Facility: CLINIC | Age: 59
End: 2024-02-14

## 2024-02-14 NOTE — TELEPHONE ENCOUNTER
Patient calling - siobhan is questioning RX way it's written vs what patient was told to do in clinic. RX does not match.Pharmacy will only provide .25 or 0.5 dose. Can dispense 2 of the .25 and 2 of the 0.5 dose but way RX is written is not acceptable to pharmacy.     Please advise.     Per AVS:     Ozempic 0.25 mg weekly x 2 weeks, then 0.5 mg weekly assuming patient  does not side effect      Per RX sent:   semaglutide (OZEMPIC, 0.25 OR 0.5 MG/DOSE,) 2 MG/3ML Subcutaneous Solution Pen-injector 4 each 1 2/13/2024 --    Sig - Route: Inject 0.25 mg into the skin once a week. - Subcutaneous    Sent to pharmacy as: Ozempic (0.25 or 0.5 MG/DOSE) 2 MG/3ML Subcutaneous Solution Pen-injector (semaglutide)    E-Prescribing Status: Receipt confirmed by pharmacy (2/13/2024  2:27 PM CST)

## 2024-02-14 NOTE — TELEPHONE ENCOUNTER
Called [facility]  spoke with Kamila , Pharmacist     Clarified RX as per 's  instructions as noted below     RX will be processed when med is available

## 2024-02-15 NOTE — TELEPHONE ENCOUNTER
Patient called in again today. RN advised we spoke with pharmacy last night and verbal order given. She states syed still states same prescription. RN asked patient to speak with the pharmacist this morning and let us know if still unable to get as requested. RN did explain at times the medication comes with so many pens per box and they can not break up the box.

## 2024-02-19 PROBLEM — E66.09 CLASS 1 OBESITY DUE TO EXCESS CALORIES WITHOUT SERIOUS COMORBIDITY WITH BODY MASS INDEX (BMI) OF 31.0 TO 31.9 IN ADULT: Status: ACTIVE | Noted: 2024-02-19

## 2024-02-19 PROBLEM — E66.811 CLASS 1 OBESITY DUE TO EXCESS CALORIES WITHOUT SERIOUS COMORBIDITY WITH BODY MASS INDEX (BMI) OF 31.0 TO 31.9 IN ADULT: Status: ACTIVE | Noted: 2024-02-19

## 2024-02-19 NOTE — PROGRESS NOTES
HPI:    Patient ID: Apple Craig is a 58 year old female.    Medication Request        Discuss management of weight loss  Discuss labs and discuss prescription for ozempi    /84 (BP Location: Right arm, Patient Position: Sitting, Cuff Size: adult)   Pulse 83   Ht 5' 3\" (1.6 m)   Wt 180 lb (81.6 kg)   LMP 01/01/2012 (Approximate)   BMI 31.89 kg/m²   Wt Readings from Last 6 Encounters:   02/13/24 180 lb (81.6 kg)   01/10/24 185 lb (83.9 kg)   01/09/24 180 lb (81.6 kg)   12/08/23 185 lb 3.2 oz (84 kg)   12/02/22 176 lb (79.8 kg)   06/24/22 175 lb (79.4 kg)     Body mass index is 31.89 kg/m².  HGBA1C:    Lab Results   Component Value Date    A1C 5.5 12/09/2023    A1C 5.4 12/03/2022    A1C 5.6 09/18/2021     12/09/2023         Review of Systems    Unable to loose weight    Current Outpatient Medications   Medication Sig Dispense Refill    semaglutide (OZEMPIC, 0.25 OR 0.5 MG/DOSE,) 2 MG/3ML Subcutaneous Solution Pen-injector Inject 0.25 mg into the skin once a week. 4 each 1    levothyroxine 137 MCG Oral Tab Take 137 mcg by mouth before breakfast. 90 tablet 0    ergocalciferol 1.25 MG (57346 UT) Oral Cap Take 1 capsule (50,000 Units total) by mouth every 30 (thirty) days. 3 capsule 3     Allergies:No Known Allergies    HISTORY:  Past Medical History:   Diagnosis Date    Cyst of left breast 01/01/2009    drainage    Disorder of thyroid     Primary hypothyroidism     Sleep apnea       Past Surgical History:   Procedure Laterality Date    COLONOSCOPY N/A 01/18/2024    Dr. Duarte; colon polyp    COLONOSCOPY N/A 1/18/2024    Procedure: COLONOSCOPY;  Surgeon: Denise Duarte MD;  Location: [facility] ENDOSCOPY    CYST ASPIRATION LEFT        Family History   Problem Relation Age of Onset    Diabetes Father     Breast Cancer Maternal Grandmother 60    Hypertension Paternal Grandmother     Breast Cancer Maternal Cousin Female 45      Social History:   Social History     Socioeconomic History    Marital status:     Tobacco Use    Smoking status: Never     Passive exposure: Never    Smokeless tobacco: Never   Vaping Use    Vaping Use: Never used   Substance and Sexual Activity    Alcohol use: No    Drug use: Never   Other Topics Concern    Caffeine Concern No        PHYSICAL EXAM:    Physical Exam  Constitutional:       Appearance: She is obese.   Neurological:      Mental Status: She is alert.   Psychiatric:         Mood and Affect: Mood normal.       Component      Latest Ref Rng 12/9/2023   WBC      4.0 - 11.0 x10(3) uL 3.7 (L)    RBC      3.80 - 5.30 x10(6)uL 4.04    Hemoglobin      12.0 - 16.0 g/dL 12.3    Hematocrit      35.0 - 48.0 % 36.6    MCV      80.0 - 100.0 fL 90.6    MCH      26.0 - 34.0 pg 30.4    MCHC      31.0 - 37.0 g/dL 33.6    RDW-SD      35.1 - 46.3 fL 42.6    RDW      11.0 - 15.0 % 12.9    Platelet Count      150.0 - 450.0 10(3)uL 197.0    Prelim Neutrophil Abs      1.50 - 7.70 x10 (3) uL 2.03    Neutrophils Absolute      1.50 - 7.70 x10(3) uL 2.03    Lymphocytes Absolute      1.00 - 4.00 x10(3) uL 1.28    Monocytes Absolute      0.10 - 1.00 x10(3) uL 0.30    Eosinophils Absolute      0.00 - 0.70 x10(3) uL 0.02    Basophils Absolute      0.00 - 0.20 x10(3) uL 0.02    Immature Granulocyte Absolute      0.00 - 1.00 x10(3) uL 0.01    Neutrophils %      % 55.5    Lymphocytes %      % 35.0    Monocytes %      % 8.2    Eosinophils %      % 0.5    Basophils %      % 0.5    Immature Granulocyte %      % 0.3    Glucose      70 - 99 mg/dL 84    Sodium      136 - 145 mmol/L 142    Potassium      3.5 - 5.1 mmol/L 4.3    Chloride      98 - 112 mmol/L 107    Carbon Dioxide, Total      21.0 - 32.0 mmol/L 31.0    ANION GAP      0 - 18 mmol/L 4    BUN      9 - 23 mg/dL 16    CREATININE      0.55 - 1.02 mg/dL 0.69    BUN/CREATININE RATIO      10.0 - 20.0  23.2 (H)    CALCIUM      8.7 - 10.4 mg/dL 9.7    CALCULATED OSMOLALITY      275 - 295 mOsm/kg 294    EGFR      >=60 mL/min/1.73m2 101    ALT (SGPT)      10 - 49 U/L 55  (H)    AST (SGOT)      <=34 U/L 34    ALKALINE PHOSPHATASE      46 - 118 U/L 75    Total Bilirubin      0.3 - 1.2 mg/dL 0.6    PROTEIN, TOTAL      5.7 - 8.2 g/dL 7.9    Albumin      3.2 - 4.8 g/dL 4.2    Globulin      2.8 - 4.4 g/dL 3.7    A/G Ratio      1.0 - 2.0  1.1    Patient Fasting for CMP? Yes    Color Urine      Yellow  Yellow    Clarity Urine      Clear  Clear    Spec Gravity      1.005 - 1.030  1.026    Glucose Urine      Normal mg/dL Normal    Bilirubin Urine      Negative  Negative    Ketones, UA      Negative mg/dL Negative    Blood Urine      Negative  1+ !    PH Urine      5.0 - 8.0  5.5    Protein Urine      Negative mg/dL 50 !    Urobilinogen Urine      Normal  Normal    Nitrite Urine      Negative  Negative    Leukocyte Esterase       Negative  Negative    WBC Urine      0 - 5 /HPF 1-5    RBC Urine      0 - 2 /HPF 0-2    Bacteria Urine      None Seen /HPF Rare !    SQUAM EPI CELLS UR      None Seen /HPF Few !    RENAL TUBULAR EPITHELIAL CELLS      None Seen /HPF None Seen    TRANSITIONAL EPI CELLS      None Seen /HPF None Seen    YEAST URINE      None Seen /HPF None Seen    Cholesterol, Total      <200 mg/dL 237 (H)    HDL Cholesterol      40 - 59 mg/dL 75 (H)    Triglycerides      30 - 149 mg/dL 115    LDL Cholesterol Calc      <100 mg/dL 142 (H)    VLDL      0 - 30 mg/dL 21    NON-HDL CHOLESTEROL      <130 mg/dL 162 (H)    Patient Fasting for Lipid? Yes    HEMOGLOBIN A1c      <5.7 % 5.5    ESTIMATED AVERAGE GLUCOSE      68 - 126 mg/dL 111    T4,Free (Direct)      0.8 - 1.7 ng/dL 1.0    TSH      0.550 - 4.780 mIU/mL 11.180 (H)       Legend:  (L) Low  (H) High  ! Abnormal         ASSESSMENT/PLAN:     (E03.9) Hypothyroidism, unspecified type  (primary encounter diagnosis)  Plan:supplement  Pt complaint with med    (E66.09,  Z68.31) Class 1 obesity due to excess calories without serious comorbidity with body mass index (BMI) of 31.0 to 31.9 in adult  Plan: advised dietary and lifestyle change as an  adjunct to wt loss  Rx ozempic as requested  Per pt researched ozempic contraindication and side effect     Ozempic 0.25 mg weekly x 2 weeks, then 0.5 mg weekly assuming patient  does not side effect  No personal or family history of MEN syndrome  Patient counselled regarding side effects including injection site reactions, nausea, vomiting, diarrhea, pancreatitis, gastroparesis and rare side effect kusum Isac syndrome.     Patient voiced understanding  and agrees with plan       Meds This Visit:  Requested Prescriptions     Signed Prescriptions Disp Refills    semaglutide (OZEMPIC, 0.25 OR 0.5 MG/DOSE,) 2 MG/3ML Subcutaneous Solution Pen-injector 4 each 1     Sig: Inject 0.25 mg into the skin once a week.       Imaging & Referrals:  None        ID#[id number]

## 2024-03-08 RX ORDER — LEVOTHYROXINE SODIUM 137 UG/1
137 TABLET ORAL
Qty: 90 TABLET | Refills: 0 | Status: SHIPPED | OUTPATIENT
Start: 2024-03-08

## 2024-03-08 RX ORDER — SEMAGLUTIDE 0.68 MG/ML
0.5 INJECTION, SOLUTION SUBCUTANEOUS WEEKLY
Qty: 4 ML | Refills: 1 | Status: SHIPPED | OUTPATIENT
Start: 2024-03-08

## 2024-03-08 NOTE — TELEPHONE ENCOUNTER
Patient is requesting a refill and mentions she is running low on medication and asking if PCP may change the dosage    OZEMPIC    Please advise    Ele in [place] confirm

## 2024-03-08 NOTE — TELEPHONE ENCOUNTER
Dr. Santamaria, patient is requesting higher dose of Ozempic, 0.5 mg dose pended for review. Thank you.    Per progress note by Dr. Santamaria 2/13/24:  (E66.09,  Z68.31) Class 1 obesity due to excess calories without serious comorbidity with body mass index (BMI) of 31.0 to 31.9 in adult  Plan: advised dietary and lifestyle change as an adjunct to wt loss  Rx ozempic as requested  Per pt researched ozempic contraindication and side effect  Ozempic 0.25 mg weekly x 2 weeks, then 0.5 mg weekly assuming patient  does not side effect

## 2024-03-08 NOTE — TELEPHONE ENCOUNTER
Please review; protocol failed/ has no protocol    Requested Prescriptions   Pending Prescriptions Disp Refills    LEVOTHYROXINE 137 MCG Oral Tab [Pharmacy Med Name: LEVOTHYROXINE 0.137MG (137MCG) TAB] 90 tablet 0     Sig: TAKE 1 TABLET BY MOUTH BEFORE BREAKFAST       Thyroid Medication Protocol Failed - 3/7/2024 12:26 PM        Failed - Last TSH value is normal     Lab Results   Component Value Date    TSH 11.180 (H) 12/09/2023                 Passed - TSH in past 12 months        Passed - In person appointment or virtual visit in the past 12 mos or appointment in next 3 mos     Recent Outpatient Visits              3 weeks [facility], [facility]    [facility]    1 month ago Encounter for gynecological examination    [facility] - [facility] Stacia Fountain MD    Office Visit    2 months ago Colon cancer screening    [facility] [place]    Nurse Only    3 months ago [facility]    [facility] Visit    1 year ago Routine general medical examination at a health care facility    [facility]    [facility]                         Recent Outpatient Visits              3 weeks ago [facility]othy[facility], unspecified type    [facility]    [facility]    1 month ago [facility]    Office Visit    2 months ago Colon cancer screening    [facility] [place]    Nurse Only    3 months ago [facility] exam    [facility]    Office Visit    1 year ago Routine general  medical examination at a health care facility    [facility] [place] Erickson Santamaria MD    Office Visit

## 2024-03-12 ENCOUNTER — TELEPHONE (OUTPATIENT)
Dept: INTERNAL MEDICINE CLINIC | Facility: CLINIC | Age: 59
End: 2024-03-12

## 2024-03-12 NOTE — TELEPHONE ENCOUNTER
Patient contacts clinic regarding ozempic.  States she took 0.25 mg for 2 weeks and then 0.5 mg for 2 weeks.  Inquires if dose should be titrated up, recent refill was to continue 0.5 mg.  Please advise.

## 2024-03-14 NOTE — TELEPHONE ENCOUNTER
Patient called back and would like to follow up the Ozempic dose clarification.   States that she took the Ozempic 0.5 mg last week Thursday and she is DUE today .           2/13/24 office visit note;  Ozempic 0.25 mg weekly x 2 weeks, then 0.5 mg weekly assuming patient  does not side effect  No personal or family history of MEN syndrome  Patient counselled regarding side effects including injection site reactions, nausea, vomiting, diarrhea, pancreatitis, gastroparesis and rare side effect kusum Isac syndrome.      No future appointments.

## 2024-03-15 NOTE — TELEPHONE ENCOUNTER
Advised patient of Dr. Santamaria's note. Patient verbalized understanding.    Pharmacy    [id number] - [place], [place] - [address] AT [facility], [phone], [phone]      Disp Refills Start End    semaglutide (OZEMPIC, 0.25 OR 0.5 MG/DOSE,) 2 MG/3ML Subcutaneous Solution Pen-injector 4 mL 1 3/8/2024 --    Sig - Route: Inject 0.5 mg into the skin once a week. - Subcutaneous    Sent to pharmacy as: Ozempic (0.25 or 0.5 MG/DOSE) 2 MG/3ML Subcutaneous Solution Pen-injector (semaglutide)    E-Prescribing Status: Receipt confirmed by pharmacy (3/8/2024  5:04 PM CST)

## 2024-04-11 RX ORDER — SEMAGLUTIDE 0.68 MG/ML
0.5 INJECTION, SOLUTION SUBCUTANEOUS WEEKLY
Qty: 4 ML | Refills: 1 | OUTPATIENT
Start: 2024-04-11

## 2024-05-07 ENCOUNTER — TELEPHONE (OUTPATIENT)
Dept: INTERNAL MEDICINE CLINIC | Facility: CLINIC | Age: 59
End: 2024-05-07

## 2024-05-07 NOTE — TELEPHONE ENCOUNTER
Patient states she has been on Ozempic 0.5mg for 3-4 months. States she is not seeing any progress and is requesting a Rx for a higher dose of Ozempic.   Patient also wondering if she needs to have any additional blood work while on Ozempic?

## 2024-05-08 ENCOUNTER — LAB ENCOUNTER (OUTPATIENT)
Dept: LAB | Age: 59
End: 2024-05-08
Attending: INTERNAL MEDICINE
Payer: COMMERCIAL

## 2024-05-08 DIAGNOSIS — E03.9 HYPOTHYROIDISM, UNSPECIFIED TYPE: ICD-10-CM

## 2024-05-08 DIAGNOSIS — R79.89 ABNORMAL TSH: ICD-10-CM

## 2024-05-08 DIAGNOSIS — E66.09 CLASS 1 OBESITY DUE TO EXCESS CALORIES WITHOUT SERIOUS COMORBIDITY WITH BODY MASS INDEX (BMI) OF 31.0 TO 31.9 IN ADULT: ICD-10-CM

## 2024-05-08 LAB
ALBUMIN SERPL-MCNC: 4.3 G/DL (ref 3.2–4.8)
ALBUMIN/GLOB SERPL: 1.2 {RATIO} (ref 1–2)
ALP LIVER SERPL-CCNC: 68 U/L
ALT SERPL-CCNC: 18 U/L
ANION GAP SERPL CALC-SCNC: 7 MMOL/L (ref 0–18)
AST SERPL-CCNC: 19 U/L (ref ?–34)
BILIRUB SERPL-MCNC: 0.6 MG/DL (ref 0.3–1.2)
BUN BLD-MCNC: 17 MG/DL (ref 9–23)
BUN/CREAT SERPL: 24.3 (ref 10–20)
CALCIUM BLD-MCNC: 9.8 MG/DL (ref 8.7–10.4)
CHLORIDE SERPL-SCNC: 109 MMOL/L (ref 98–112)
CO2 SERPL-SCNC: 27 MMOL/L (ref 21–32)
CREAT BLD-MCNC: 0.7 MG/DL
EGFRCR SERPLBLD CKD-EPI 2021: 100 ML/MIN/1.73M2 (ref 60–?)
EST. AVERAGE GLUCOSE BLD GHB EST-MCNC: 105 MG/DL (ref 68–126)
FASTING STATUS PATIENT QL REPORTED: YES
GLOBULIN PLAS-MCNC: 3.6 G/DL (ref 2–3.5)
GLUCOSE BLD-MCNC: 86 MG/DL (ref 70–99)
HBA1C MFR BLD: 5.3 % (ref ?–5.7)
OSMOLALITY SERPL CALC.SUM OF ELEC: 297 MOSM/KG (ref 275–295)
POTASSIUM SERPL-SCNC: 4.2 MMOL/L (ref 3.5–5.1)
PROT SERPL-MCNC: 7.9 G/DL (ref 5.7–8.2)
SODIUM SERPL-SCNC: 143 MMOL/L (ref 136–145)
T4 FREE SERPL-MCNC: 1.9 NG/DL (ref 0.8–1.7)
TSI SER-ACNC: 0.02 MIU/ML (ref 0.55–4.78)

## 2024-05-08 PROCEDURE — 84439 ASSAY OF FREE THYROXINE: CPT

## 2024-05-08 PROCEDURE — 83036 HEMOGLOBIN GLYCOSYLATED A1C: CPT

## 2024-05-08 PROCEDURE — 84443 ASSAY THYROID STIM HORMONE: CPT

## 2024-05-08 PROCEDURE — 80053 COMPREHEN METABOLIC PANEL: CPT

## 2024-05-08 PROCEDURE — 36415 COLL VENOUS BLD VENIPUNCTURE: CPT

## 2024-06-14 RX ORDER — LEVOTHYROXINE SODIUM 0.12 MG/1
125 TABLET ORAL
Qty: 90 TABLET | Refills: 3 | Status: SHIPPED | OUTPATIENT
Start: 2024-06-14

## 2024-06-14 NOTE — TELEPHONE ENCOUNTER
Left detailed message on patient's voicemail, ok per PREET that medications were sent to pharmacy.

## 2024-06-14 NOTE — TELEPHONE ENCOUNTER
Dr. Santamaria, patient is out of Ozempic, please advise on refill     Spoke to patient (name and  of patient verified). She is calling for update on refill request. She is out of Ozempic and weekly dose overdue. Will route high priority

## 2024-06-14 NOTE — TELEPHONE ENCOUNTER
Please review. Protocol Failed; No Protocol    Please advise on Levothyroxine strength:   Latonya Ibarra RN  5/13/2024  7:36 AM CDT Back to Top      Written by Erickson Santamaria MD on 5/13/2024  3:25 AM CDT  Seen by patient Apple Craig on 5/13/2024  5:25 AM    Erickson Santamaria MD  5/13/2024  3:25 AM CDT       Send letter and copy of test result.  Hyper thyroid TSH is low  Need to lower her dose    from 137 to 125 mcg daily  Order TSH in 6 wks      Patient discontinued.     Requested Prescriptions   Pending Prescriptions Disp Refills    semaglutide 4 MG/3ML Subcutaneous Solution Pen-injector 4 each 1     Sig: Inject 1 mg into the skin once a week.       Diabetes Medication Protocol Failed - 6/11/2024  8:25 AM        Failed - Microalbumin procedure in past 12 months or taking ACE/ARB        Passed - Last A1C < 7.5 and within past 6 months     Lab Results   Component Value Date    A1C 5.3 05/08/2024             Passed - In person appointment or virtual visit in the past 6 mos or appointment in next 3 mos     Recent Outpatient Visits              4 months ago Hypothyroidism, unspecified type    [facility]    Office Visit    5 months ago Encounter for gynecological examination    [facility] - OB/GYN Stacia Fountain MD    Office Visit    5 months ago Colon cancer screening    [facility]    Nurse Only    6 months ago Physical exam    [facility] Erickson Tran MD    Office Visit    1 year ago Routine general medical examination at a health care facility    [facility] Erickson Santamaria MD    Office Visit                      Passed - EGFRCR or GFRNAA > 50     GFR Evaluation  EGFRCR: 100 , resulted on 5/8/2024          Passed - GFR in the past 12 months          levothyroxine 125 MCG Oral  Tab 90 tablet 0     Sig: Take 1 tablet (125 mcg total) by mouth before breakfast.       Thyroid Medication Protocol Failed - 6/11/2024  8:25 AM        Failed - Last TSH value is normal     Lab Results   Component Value Date    TSH 0.019 (L) 05/08/2024                 Passed - TSH in past 12 months        Passed - In person appointment or virtual visit in the past 12 mos or appointment in next 3 mos     Recent Outpatient Visits              4 months ago [facility], unspecified type    [facility]    [facility]    5 months ago [facility] gynecological examination    [facility]    Office Visit    5 months ago Colon cancer screening    [facility]    Nurse Only    6 months ago Physical exam    [facility]    Office Visit    1 year ago Routine general medical examination at a health care facility    [facility]    [facility] Visit                               Recent Outpatient Visits              4 months ago Hypothyroidism, unspecified type    [facility]    Office Visit    5 months ago Encounter for gynecological examination    [facility]    Office Visit    5 months ago Colon cancer screening    [facility]    [facility]    6 months ago [facility] exam    [facility]    Office Visit    1 year ago Routine general medical examination at a health care facility    [facility]    Office Visit

## 2024-06-24 ENCOUNTER — HOSPITAL ENCOUNTER (OUTPATIENT)
Age: 59
Discharge: HOME | End: 2024-06-24

## 2024-06-24 VITALS
SYSTOLIC BLOOD PRESSURE: 113 MMHG | HEART RATE: 80 BPM | OXYGEN SATURATION: 100 % | DIASTOLIC BLOOD PRESSURE: 73 MMHG | RESPIRATION RATE: 17 BRPM | TEMPERATURE: 97 F

## 2024-06-24 DIAGNOSIS — L03.116 CELLULITIS OF LEFT LOWER EXTREMITY: Primary | ICD-10-CM

## 2024-06-24 PROCEDURE — 99213 OFFICE O/P EST LOW 20 MIN: CPT

## 2024-06-24 PROCEDURE — 99214 OFFICE O/P EST MOD 30 MIN: CPT

## 2024-06-24 RX ORDER — CEPHALEXIN 500 MG/1
500 CAPSULE ORAL 4 TIMES DAILY
Qty: 40 CAPSULE | Refills: 0 | Status: SHIPPED | OUTPATIENT
Start: 2024-06-24 | End: 2024-07-04

## 2024-06-24 NOTE — ED PROVIDER NOTES
Patient Seen in: [facility]      History     Chief Complaint   Patient presents with    Bite Sting,Insect     Stated Complaint: Bug Bite    Subjective:   HPI    57yo female p/w insect bite to LLE after gardening on Thursday. Has been using hydrocortisone, but noticed ankle is more swollen and red. No new medications/foods/detergents/soaps/hair dyes/colognes/perfumes noted. No hives/wheals.       Objective:   Past Medical History:    Cyst of left breast    drainage    Disorder of thyroid    Primary hypothyroidism    Sleep apnea              Past Surgical History:   Procedure Laterality Date    Colonoscopy N/A 01/18/2024    Dr. Duarte; colon polyp    Colonoscopy N/A 1/18/2024    Procedure: COLONOSCOPY;  Surgeon: Denise Duarte MD;  Location: [facility] ENDOSCOPY    Cyst aspiration left                  Social History     Socioeconomic History    Marital status:    Tobacco Use    Smoking status: Never     Passive exposure: Never    Smokeless tobacco: Never   Vaping Use    Vaping status: Never Used   Substance and Sexual Activity    Alcohol use: No    Drug use: Never   Other Topics Concern    Caffeine Concern No              Review of Systems    Positive for stated complaint: Bug Bite  Other systems are as noted in HPI.  Constitutional and vital signs reviewed.      All other systems reviewed and negative except as noted above.    Physical Exam     ED Triage Vitals [06/24/24 1038]   /73   Pulse 80   Resp 17   Temp 97.3 °F (36.3 °C)   Temp src Temporal   SpO2 100 %   O2 Device        Current Vitals:   Vital Signs  BP: 113/73  Pulse: 80  Resp: 17  Temp: 97.3 °F (36.3 °C)  Temp src: Temporal    Oxygen Therapy  SpO2: 100 %            Physical Exam  Vitals and nursing note reviewed.   Constitutional:       General: She is not in acute distress.     Appearance: She is not toxic-appearing.   HENT:      Head: Normocephalic.      Nose: Nose normal. No congestion or rhinorrhea.      Mouth/Throat:      Mouth:  Mucous membranes are moist.   Pulmonary:      Effort: Pulmonary effort is normal. No respiratory distress.   Abdominal:      General: Abdomen is flat.   Musculoskeletal:         General: Normal range of motion.      Cervical back: Normal range of motion.      Right ankle:      Right Achilles Tendon: Normal.      Left ankle:      Left Achilles Tendon: Normal.        Feet:       Comments: Good pulses, calf is non-tender and soft.    Skin:     General: Skin is warm and dry.      Capillary Refill: Capillary refill takes less than 2 seconds.   Neurological:      General: No focal deficit present.      Mental Status: She is alert.               ED Course   Labs Reviewed - No data to display                   MDM                                 Medical Decision Making  57yo female pw insect bite 3 days ago now concerning for cellulitic v abscess infection. No systemic fevers, does not appear allergic. Not tick bite. Will treat with antibiotics. Discussed return precautions.     Physical exam remained stable over serial reexaminations as previously documented. External chart review completed. No recent hospitalizations for the same.      I have discussed with the patient the results of tests, differential diagnosis, and warning signs and symptoms that should prompt immediate return.  The patient understands these instructions and agrees to the follow-up plan provided.  There are no barriers to learning.   Appropriate f/u given.  Patient agrees to return for any concerns/problems/complications.  Differential diagnosis reflecting the complexity of care include: cellulitis v abscess, allergy    Comorbidities that add complexity to management include:na    External chart review was done and was noted:Yes    History obtained by an independent source was from: Patient    Discussions of management was done with:Patient    My independent interpretation of studies of:na    Diagnostic tests and medications considered but not ordered  were:na    Social determinants of health that affect care:NA    Shared decision making was done by Self, Patient          Disposition and Plan     Clinical Impression:  1. Cellulitis of left lower extremity         Disposition:  Discharge  6/24/2024 10:54 am    Follow-up:  Erickson Santamaria MD  [address] [place] [id number]  [phone]                Medications Prescribed:  Discharge Medication List as of 6/24/2024 10:54 AM        START taking these medications    Details   cephalexin 500 MG Oral Cap Take 1 capsule (500 mg total) by mouth 4 (four) times daily for 10 days., Normal, Disp-40 capsule, R-0

## 2024-06-24 NOTE — DISCHARGE INSTRUCTIONS
REFER TO HANDOUT FOR FURTHER DISCHARGE CARE.  MAY TAKE OVER THE COUNTER MEDICATIONS SUCH AS TYLENOL (ACETAMINOPHEN)  OR MOTRIN (IBUPROFEN) FOR PAIN.    -Please call your doctor or return if you notice signs of infection including:  a)Redness  b)Swelling  c)Foul odor  d)Discharge  e)Fever  f)Increased Pain  g)If the wound becomes warm to the touch

## 2024-06-24 NOTE — ED INITIAL ASSESSMENT (HPI)
Patient with left foot bug bite from gardening last Thursday  Itchy  Neosporin and benadryl used    Foot is swollen

## 2024-07-19 ENCOUNTER — OFFICE VISIT (OUTPATIENT)
Dept: INTERNAL MEDICINE CLINIC | Facility: CLINIC | Age: 59
End: 2024-07-19

## 2024-07-19 VITALS
SYSTOLIC BLOOD PRESSURE: 128 MMHG | OXYGEN SATURATION: 98 % | HEART RATE: 79 BPM | WEIGHT: 154 LBS | BODY MASS INDEX: 26.29 KG/M2 | DIASTOLIC BLOOD PRESSURE: 75 MMHG | TEMPERATURE: 98 F | HEIGHT: 64 IN

## 2024-07-19 DIAGNOSIS — J06.9 URI, ACUTE: Primary | ICD-10-CM

## 2024-07-19 LAB
CONTROL LINE PRESENT WITH A CLEAR BACKGROUND (YES/NO): YES YES/NO
COVID19 BINAX NOW ANTIGEN: NOT DETECTED
KIT LOT #: 7282 NUMERIC
OPERATOR ID: NORMAL
POCT LOT NUMBER: NORMAL
STREP GRP A CUL-SCR: NEGATIVE

## 2024-07-19 PROCEDURE — 87880 STREP A ASSAY W/OPTIC: CPT | Performed by: INTERNAL MEDICINE

## 2024-07-19 PROCEDURE — 99213 OFFICE O/P EST LOW 20 MIN: CPT | Performed by: INTERNAL MEDICINE

## 2024-07-19 NOTE — PROGRESS NOTES
Subjective:     Patient ID: Apple Craig is a 58 year old female.    Sore Throat   Associated symptoms include coughing.       History/Other:   She came in today complaining of sore throat , cough and runny nose    Per her it is ongoing for 4 days . No fever or chills    She has no other symptoms   Review of Systems   Constitutional: Negative.    HENT:  Positive for rhinorrhea and sore throat.    Respiratory:  Positive for cough.    Cardiovascular: Negative.    Gastrointestinal: Negative.    Genitourinary: Negative.    Musculoskeletal: Negative.    Neurological: Negative.    Hematological: Negative.    Psychiatric/Behavioral: Negative.       Current Outpatient Medications   Medication Sig Dispense Refill    semaglutide 4 MG/3ML Subcutaneous Solution Pen-injector Inject 1 mg into the skin once a week. 4 each 1    levothyroxine 137 MCG Oral Tab Take 137 mcg by mouth before breakfast. 90 tablet 0    ergocalciferol 1.25 MG (75626 UT) Oral Cap Take 1 capsule (50,000 Units total) by mouth every 30 (thirty) days. 3 capsule 3    levothyroxine 125 MCG Oral Tab Take 1 tablet (125 mcg total) by mouth before breakfast. (Patient not taking: Reported on 7/19/2024) 90 tablet 3     Allergies:No Known Allergies    Past Medical History:    Cyst of left breast    drainage    Disorder of thyroid    Primary hypothyroidism    Sleep apnea      Past Surgical History:   Procedure Laterality Date    Colonoscopy N/A 01/18/2024    Dr. Duarte; colon polyp    Colonoscopy N/A 1/18/2024    Procedure: COLONOSCOPY;  Surgeon: Denise Duarte MD;  Location: [facility] ENDOSCOPY    Cyst aspiration left        Family History   Problem Relation Age of Onset    Diabetes Father     Breast Cancer Maternal Grandmother 60    Hypertension Paternal Grandmother     Breast Cancer Maternal Cousin Female 45      Social History:   Social History     Socioeconomic History    Marital status:    Tobacco Use    Smoking status: Never     Passive exposure: Never     Smokeless tobacco: Never   Vaping Use    Vaping status: Never Used   Substance and Sexual Activity    Alcohol use: No    Drug use: Never   Other Topics Concern    Caffeine Concern No        Objective:   Physical Exam  Vitals and nursing note reviewed.   Constitutional:       Appearance: Normal appearance.   HENT:      Head: Normocephalic and atraumatic.   Cardiovascular:      Rate and Rhythm: Normal rate and regular rhythm.      Pulses: Normal pulses.      Heart sounds: Normal heart sounds.   Pulmonary:      Effort: Pulmonary effort is normal.      Breath sounds: Normal breath sounds.   Abdominal:      Palpations: Abdomen is soft.   Musculoskeletal:         General: Normal range of motion.      Cervical back: Normal range of motion and neck supple.   Skin:     General: Skin is warm.   Neurological:      Mental Status: She is alert. Mental status is at baseline.         Assessment & Plan:   No diagnosis found.  Uri acute - strep and covid is negative, drink fluids, otc cough medications, tylenol as needed ,  if not better follow up  No orders of the defined types were placed in this encounter.      Meds This Visit:  Requested Prescriptions      No prescriptions requested or ordered in this encounter       Imaging & Referrals:  None

## 2024-08-08 ENCOUNTER — NURSE TRIAGE (OUTPATIENT)
Dept: INTERNAL MEDICINE CLINIC | Facility: CLINIC | Age: 59
End: 2024-08-08

## 2024-08-08 ENCOUNTER — TELEMEDICINE (OUTPATIENT)
Dept: INTERNAL MEDICINE CLINIC | Facility: CLINIC | Age: 59
End: 2024-08-08
Payer: COMMERCIAL

## 2024-08-08 DIAGNOSIS — R05.1 ACUTE COUGH: Primary | ICD-10-CM

## 2024-08-08 PROCEDURE — 99213 OFFICE O/P EST LOW 20 MIN: CPT | Performed by: INTERNAL MEDICINE

## 2024-08-08 RX ORDER — BENZONATATE 100 MG/1
100 CAPSULE ORAL 3 TIMES DAILY PRN
Qty: 30 CAPSULE | Refills: 0 | Status: SHIPPED | OUTPATIENT
Start: 2024-08-08

## 2024-08-08 NOTE — PROGRESS NOTES
Patient ID: Apple Craig is a 58 year old female.  Chief Complaint   Patient presents with    Cough          HISTORY OF PRESENT ILLNESS:   Patient presents for above.  This visit is conducted using Telemedicine with live, interactive video and audio.  Patient with a 2-week history of dry cough.  No fevers or chills.  She seldomly brings up any phlegm.  She does not smoke.  She does have hypothyroidism for which her levothyroxine dosage was increased several months prior.  She has not rechecked her labs.  Thyroid ultrasound done in December 2023 showed multiple small nodules that should be followed up in 1 year with another ultrasound.  She stopped taking levothyroxine 2 days ago as she thought the adjustment made several months ago may be the cause of her cough.    Review of Systems   Constitutional: Negative.    HENT: Negative.     Eyes: Negative.    Respiratory:  Positive for cough.    Cardiovascular: Negative.    Endocrine: Negative.    Genitourinary: Negative.    Musculoskeletal: Negative.    Allergic/Immunologic: Negative.    Neurological: Negative.    Hematological: Negative.       MEDICAL HISTORY:     Past Medical History:    Cyst of left breast    drainage    Disorder of thyroid    Primary hypothyroidism    Sleep apnea       Past Surgical History:   Procedure Laterality Date    Colonoscopy N/A 01/18/2024    Dr. Duarte; colon polyp    Colonoscopy N/A 1/18/2024    Procedure: COLONOSCOPY;  Surgeon: Denise Duarte MD;  Location: [facility] ENDOSCOPY    Cyst aspiration left           Current Outpatient Medications:     benzonatate (TESSALON PERLES) 100 MG Oral Cap, Take 1 capsule (100 mg total) by mouth 3 (three) times daily as needed., Disp: 30 capsule, Rfl: 0    semaglutide 4 MG/3ML Subcutaneous Solution Pen-injector, Inject 1 mg into the skin once a week., Disp: 4 each, Rfl: 1    levothyroxine 125 MCG Oral Tab, Take 1 tablet (125 mcg total) by mouth before breakfast. (Patient not taking: Reported on 7/19/2024), Disp:  90 tablet, Rfl: 3    levothyroxine 137 MCG Oral Tab, Take 137 mcg by mouth before breakfast., Disp: 90 tablet, Rfl: 0    ergocalciferol 1.25 MG (53666 UT) Oral Cap, Take 1 capsule (50,000 Units total) by mouth every 30 (thirty) days., Disp: 3 capsule, Rfl: 3    Allergies:No Known Allergies    Social History     Socioeconomic History    Marital status:      Spouse name: Not on file    Number of children: Not on file    Years of education: Not on file    Highest education level: Not on file   Occupational History    Not on file   Tobacco Use    Smoking status: Never     Passive exposure: Never    Smokeless tobacco: Never   Vaping Use    Vaping status: Never Used   Substance and Sexual Activity    Alcohol use: No    Drug use: Never    Sexual activity: Not on file   Other Topics Concern     Service Not Asked    Blood Transfusions Not Asked    Caffeine Concern No    Occupational Exposure Not Asked    Hobby Hazards Not Asked    Sleep Concern Not Asked    Stress Concern Not Asked    Weight Concern Not Asked    Special Diet Not Asked    Back Care Not Asked    Exercise Not Asked    Bike Helmet Not Asked    Seat Belt Not Asked    Self-Exams Not Asked   Social History Narrative    Not on file     Social Determinants of Health     Financial Resource Strain: Not on file   Food Insecurity: Not on file   Transportation Needs: Not on file   Physical Activity: Not on file   Stress: Not on file   Social Connections: Not on file   Housing Stability: Not on file       PHYSICAL EXAM:   Unable to perform vitals or do physical exam as this is a virtual video visit.  Patient appears alert.  No conversational dyspnea or distress.    ASSESSMENT/PLAN:   1. Acute cough  XR CHEST PA + LAT CHEST (CPT=71046); Future  benzonatate (TESSALON PERLES) 100 MG Oral Cap; Take 1 capsule (100 mg total) by mouth 3 (three) times daily as needed.  Dispense: 30 capsule; Refill: 0  Tried to explain to the best ability that thyroid cancer is low  probability for cough.  Also explained that should never stop thyroid replacement medication so abruptly.  Orders are already in system by her PCP to recheck thyroid levels.    Return if symptoms worsen or fail to improve.    Time spent on encounter  12 minutes   Video time 7 minutes   Documentation time 5 minutes     Apple Craig understands video evaluation is not a substitute for face-to-face examination or emergency care. Patient advised to go to ER or call 911 for worsening symptoms or acute distress.     Telehealth outside of [facility]  Telehealth Verbal Consent   I conducted a telehealth visit with Apple Craig today, 08/08/24, which was completed using two-way, real-time interactive audio and video communication. This has been done in good parmjit to provide continuity of care in the best interest of the provider-patient relationship, due to the COVID -19 public health crisis/national emergency where restrictions of face-to-face office visits are ongoing. Every conscious effort was taken to allow for sufficient and adequate time to complete the visit.  The patient was made aware of the limitations of the telehealth visit, including treatment limitations as no physical exam could be performed.  The patient was advised to call 911 or to go to the ER in case there was an emergency.  The patient was also advised of the potential privacy & security concerns related to the telehealth platform.   The patient was made aware of where to find [facility]'s notice of privacy practices, telehealth consent form and other related consent forms and documents.  which are located on the [facility] website. The patient verbally agreed to telehealth consent form, related consents and the risks discussed.    Lastly, the patient confirmed that they were in [place].   Included in this visit, time may have been spent reviewing labs, medications, radiology tests and decision making. Appropriate medical decision-making and tests are ordered as  detailed in the plan of care above.  Coding/billing information is submitted for this visit based on complexity of care and/or time spent for the visit.    This note was prepared using Dragon Medical voice recognition dictation software. As a result errors may occur. When identified these errors have been corrected. While every attempt is made to correct errors during dictation discrepancies may still exist.    Oli Mckinney MD  8/8/2024

## 2024-08-08 NOTE — TELEPHONE ENCOUNTER
Action Requested: Summary for Provider     []  Critical Lab, Recommendations Needed  [] Need Additional Advice  []   FYI    []   Need Orders  [] Need Medications Sent to Pharmacy  []  Other     SUMMARY: Patient having ongoing cough and she is not sure if this URI continuing or related to her thyroid. She is asking for visit to discuss possible order for US and follow up. She on her own decided to stop her thyroid medication 2 days ago thinking maybe meds were causing problem. Patient had does adjustment in May, and was to recheck in 6 weeks. Not completed because she thought something would be mailed to her and did not realize order was in the system for her to complete.     Pt scheduled for video visit today to review concerns and discuss plan.       Future Appointments   Date Time Provider Department Center   8/8/2024  4:00 PM Oli Mckinney MD [facility]   9/4/2024  3:20 PM Erickson Santamaria MD [facility] [place], [facility]  5/13/2024  7:36 AM CDT Back to Top      Written by Erickson Santamaria MD on 5/13/2024  3:25 AM CDT  Seen by patient Apple Craig on 5/13/2024  5:25 AM    Erickson Santamaria MD  5/13/2024  3:25 AM CDT       Send letter and copy of test result.  Hyper thyroid TSH is low  Need to lower her dose    from 137 to 125 mcg daily  Order TSH in 6 wks     Other albs within normal       Reason for call: Cough  Onset: cough for 3 weeks       Patient Comments:  Persistent coughing that has lingered for two weeks.  I need to screen for thyroid cancer.    Reason for Disposition   Continuous (nonstop) coughing interferes with work or school and no improvement using cough treatment per Care Advice    Protocols used: Cough-A-OH     no

## 2024-08-09 ENCOUNTER — TELEPHONE (OUTPATIENT)
Dept: INTERNAL MEDICINE CLINIC | Facility: CLINIC | Age: 59
End: 2024-08-09

## 2024-08-09 RX ORDER — LEVOTHYROXINE SODIUM 0.12 MG/1
125 TABLET ORAL
Qty: 90 TABLET | Refills: 3 | Status: SHIPPED | OUTPATIENT
Start: 2024-08-09

## 2024-08-09 RX ORDER — ERGOCALCIFEROL 1.25 MG/1
50000 CAPSULE ORAL
Qty: 3 CAPSULE | Refills: 3 | Status: SHIPPED | OUTPATIENT
Start: 2024-08-09

## 2024-08-09 NOTE — TELEPHONE ENCOUNTER
Medication already sent into today. Patient resent request because she did not see that medication was approved.

## 2024-08-09 NOTE — TELEPHONE ENCOUNTER
Please review. Protocol Failed; No Protocol    Requested Prescriptions   Pending Prescriptions Disp Refills    ergocalciferol 1.25 MG (84532 UT) Oral Cap 3 capsule 3     Sig: Take 1 capsule (50,000 Units total) by mouth every 30 (thirty) days.       There is no refill protocol information for this order            Future Appointments         Provider Department Appt Notes    In 3 weeks Erickson Santamaria MD [facility] Persistent coughing that has lingered for two weeks.  I need to screen for thyroid cancer.          Recent Outpatient Visits              Yesterday Acute cough    [facility] Oli Mckinney MD    Telemedicine    3 weeks ago URI, acute    [facility] [place], Jacquelin Boss MD    Office Visit    5 months ago Hypothyroidism, unspecified type    [facility] Erickson Santamaria MD    Office Visit    7 months ago Encounter for gynecological examination    [facility] Stacia Fountain MD    Office Visit    7 months ago Colon cancer screening    [facility]    Nurse Only

## 2024-08-09 NOTE — TELEPHONE ENCOUNTER
Please review; protocol failed/ has no protocol      Routing to pod mate as  is out of office until 08/12/2024    May Ptaiño18 minutes ago (1:46 PM)     RA  Patient states that she is out of her medication needs refill right away.           Requested Prescriptions   Pending Prescriptions Disp Refills    levothyroxine 125 MCG Oral Tab 90 tablet 3     Sig: Take 1 tablet (125 mcg total) by mouth before breakfast.       Thyroid Medication Protocol Failed - 8/9/2024  1:47 PM        Failed - Last TSH value is normal     Lab Results   Component Value Date    TSH 0.019 (L) 05/08/2024                 Passed - TSH in past 12 months        Passed - In person appointment or virtual visit in the past 12 mos or appointment in next 3 mos     Recent Outpatient Visits              [facility]    3 weeks ago [facility] Yinka Hernandez Arlinda, MD    Office Visit    5 months ago Hypothyroidism, unspecified type    [facility] [place] [facility] [place] [facility]    Office Visit    7 months ago Encounter for gynecological examination    [facility] Stacia Fountain MD    Office Visit    7 months ago Colon cancer screening    [facility]    Nurse Only          Future Appointments         Provider Department Appt Notes    In 3 weeks Erickson Santamaria MD [facility] Persistent coughing that has lingered for two weeks.  I need to screen for thyroid cancer.                       Recent Outpatient Visits              [facility]    [facility]    3 weeks ago [facility]  MD Jacquelin    Office Visit    5 months ago Hypothyroidism, unspecified type    [facility] Erickson Santamaria MD    Office Visit    7 months ago Encounter for gynecological examination    [facility] - OB[facility]GYN Stacia Fountain MD    Office Visit    7 months ago Colon cancer screening    [facility]    Nurse Only          Future Appointments         Provider Department Appt Notes    In 3 weeks Erickson Santamaria MD [facility] Persistent coughing that has lingered for two weeks.  I need to screen for thyroid cancer.

## 2024-08-09 NOTE — TELEPHONE ENCOUNTER
Patient called and would like a nurse to call her and explain why her ozempic was not refilled but all her other medications were.Please advise

## 2024-08-09 NOTE — TELEPHONE ENCOUNTER
Please review.  Protocol failed / Has no protocol.    Marked High Priority, patient states out of medication     Requested Prescriptions   Pending Prescriptions Disp Refills    semaglutide 4 MG/3ML Subcutaneous Solution Pen-injector 4 each 1     Sig: Inject 1 mg into the skin once a week.       Diabetes Medication Protocol Failed - 8/9/2024  1:50 PM        Failed - Microalbumin procedure in past 12 months or taking ACE/ARB        Passed - Last A1C < 7.5 and within past 6 months     Lab Results   Component Value Date    A1C 5.3 05/08/2024             Passed - In person appointment or virtual visit in the past 6 mos or appointment in next 3 mos     Recent Outpatient Visits              Yesterday Acute cough    [facility] Oli Mckinney MD    Telemedicine    3 weeks ago URI, acute    [facility] [place]    Office Visit    5 months ago Hypothyroidism, unspecified type    [facility] Erickson Santamaria MD    Office Visit    7 months ago Encounter for gynecological examination    [facility] - OB/GYN Stacia Fountain MD    Office Visit    7 months ago Colon cancer screening    [facility]    Nurse Only          Future Appointments         Provider Department Appt Notes    In 3 weeks Erickson Santamaria MD [facility] Persistent coughing that has lingered for two weeks.  I need to screen for thyroid cancer.                    Passed - EGFRCR or GFRNAA > 50     GFR Evaluation  EGFRCR: 100 , resulted on 5/8/2024          Passed - GFR in the past 12 months           Future Appointments         Provider Department Appt Notes    In 3 weeks Erickson Santamaria MD [facility] Persistent coughing that has lingered for two weeks.  I need to  screen for thyroid cancer.          Recent Outpatient Visits              Yesterday Acute cough    [facility] Oli Mckinney MD    Telemedicine    3 weeks ago URI, acute    [facility] [place]    Office Visit    5 months ago Hypothyroidism, unspecified type    [facility] Erickson Santamaria MD    Office Visit    7 months ago Encounter for gynecological examination    [facility] Stacia Fountain MD    Office Visit    7 months ago Colon cancer screening    [facility]    Nurse Only

## 2024-08-24 ENCOUNTER — LAB ENCOUNTER (OUTPATIENT)
Dept: LAB | Age: 59
End: 2024-08-24
Attending: INTERNAL MEDICINE
Payer: COMMERCIAL

## 2024-08-24 DIAGNOSIS — E03.9 HYPOTHYROIDISM, UNSPECIFIED TYPE: ICD-10-CM

## 2024-08-24 LAB
T4 FREE SERPL-MCNC: 1.9 NG/DL (ref 0.8–1.7)
TSI SER-ACNC: 0.07 MIU/ML (ref 0.55–4.78)

## 2024-08-24 PROCEDURE — 36415 COLL VENOUS BLD VENIPUNCTURE: CPT

## 2024-08-24 PROCEDURE — 84443 ASSAY THYROID STIM HORMONE: CPT

## 2024-08-24 PROCEDURE — 84439 ASSAY OF FREE THYROXINE: CPT

## 2024-08-26 ENCOUNTER — HOSPITAL ENCOUNTER (OUTPATIENT)
Dept: GENERAL RADIOLOGY | Age: 59
Discharge: HOME | End: 2024-08-26
Attending: INTERNAL MEDICINE
Payer: COMMERCIAL

## 2024-08-26 ENCOUNTER — OFFICE VISIT (OUTPATIENT)
Dept: INTERNAL MEDICINE CLINIC | Facility: CLINIC | Age: 59
End: 2024-08-26
Payer: COMMERCIAL

## 2024-08-26 VITALS
HEART RATE: 93 BPM | SYSTOLIC BLOOD PRESSURE: 118 MMHG | BODY MASS INDEX: 28 KG/M2 | DIASTOLIC BLOOD PRESSURE: 75 MMHG | HEIGHT: 63 IN | WEIGHT: 158 LBS

## 2024-08-26 DIAGNOSIS — Z91.09 ENVIRONMENTAL ALLERGIES: ICD-10-CM

## 2024-08-26 DIAGNOSIS — R05.2 SUBACUTE COUGH: ICD-10-CM

## 2024-08-26 DIAGNOSIS — R05.2 SUBACUTE COUGH: Primary | ICD-10-CM

## 2024-08-26 DIAGNOSIS — J34.2 NASAL SEPTAL DEVIATION: ICD-10-CM

## 2024-08-26 PROCEDURE — 96372 THER/PROPH/DIAG INJ SC/IM: CPT | Performed by: INTERNAL MEDICINE

## 2024-08-26 PROCEDURE — 71046 X-RAY EXAM CHEST 2 VIEWS: CPT | Performed by: INTERNAL MEDICINE

## 2024-08-26 PROCEDURE — 99214 OFFICE O/P EST MOD 30 MIN: CPT | Performed by: INTERNAL MEDICINE

## 2024-08-26 RX ORDER — ALBUTEROL SULFATE 90 UG/1
2 AEROSOL, METERED RESPIRATORY (INHALATION) EVERY 4 HOURS PRN
Qty: 1 EACH | Refills: 0 | Status: SHIPPED | OUTPATIENT
Start: 2024-08-26 | End: 2025-08-26

## 2024-08-26 RX ADMIN — CYANOCOBALAMIN 1000 MCG: 1000 INJECTION, SOLUTION INTRAMUSCULAR; SUBCUTANEOUS at 16:25:00

## 2024-08-27 RX ORDER — LEVOTHYROXINE SODIUM 137 UG/1
137 TABLET ORAL
COMMUNITY
Start: 2024-08-27

## 2024-08-27 NOTE — PROGRESS NOTES
HPI:    Patient ID: Apple Craig is a 58 year old female.    HPI    Persistent dry hacky cough  no fever no chills  Started July seen at urgent care dx with viral URI  Follow up with IM  conservative mg  Cough  post nasal drip  deviated septum to the right      Seen by DR Miller 6/24/22  ASSESSMENT AND PLAN     1. Obstructive sleep apnea  - DME - EXTERNAL      2. Deviated nasal septum  At this time she is more concerned about the obstructive sleep apnea and I have recommended that she undergo an AutoPap titration and to use to equipment for at least 1 to 2 months return to see me at that time to see if she tolerates the device.  If she continues to have daytime nasal obstruction or intolerance to her CPAP she may benefit from septoplasty.  She states understanding of return to see me in a few months for reevaluation       /75 (BP Location: Right arm, Patient Position: Sitting, Cuff Size: adult)   Pulse 93   Ht 5' 3\" (1.6 m)   Wt 158 lb (71.7 kg)   LMP 01/01/2012 (Approximate)   BMI 27.99 kg/m²   Wt Readings from Last 6 Encounters:   08/26/24 158 lb (71.7 kg)   07/19/24 154 lb (69.9 kg)   02/13/24 180 lb (81.6 kg)   01/10/24 185 lb (83.9 kg)   01/09/24 180 lb (81.6 kg)   12/08/23 185 lb 3.2 oz (84 kg)     Body mass index is 27.99 kg/m².  HGBA1C:    Lab Results   Component Value Date    A1C 5.3 05/08/2024    A1C 5.5 12/09/2023    A1C 5.4 12/03/2022     05/08/2024         Review of Systems   Constitutional:  Negative for chills, fatigue and fever.   HENT:  Positive for congestion and postnasal drip. Negative for sinus pressure, sinus pain, sore throat and trouble swallowing.         Chronic hearing loss   left hearing aid  cannot hear without the hearing aid left  TMs intact    Cobble stoning posterior pharynx   Respiratory:  Positive for cough and wheezing (slight at times).    Gastrointestinal:  Negative for abdominal pain.   Neurological:  Negative for dizziness and headaches.         Current  Outpatient Medications   Medication Sig Dispense Refill    semaglutide 4 MG/3ML Subcutaneous Solution Pen-injector Inject 1 mg into the skin once a week.      levothyroxine 137 MCG Oral Tab Take 137 mcg by mouth before breakfast.      albuterol (PROAIR HFA) 108 (90 Base) MCG/ACT Inhalation Aero Soln Inhale 2 puffs into the lungs every 4 (four) hours as needed for Wheezing. 1 each 0    levothyroxine 125 MCG Oral Tab Take 1 tablet (125 mcg total) by mouth before breakfast. 90 tablet 3     Allergies:No Known Allergies    HISTORY:  Past Medical History:    Cyst of left breast    drainage    Disorder of thyroid    Primary hypothyroidism    Sleep apnea      Past Surgical History:   Procedure Laterality Date    Colonoscopy N/A 01/18/2024    Dr. Duarte; colon polyp    Colonoscopy N/A 1/18/2024    Procedure: COLONOSCOPY;  Surgeon: Denise Duarte MD;  Location: [facility] ENDOSCOPY    Cyst aspiration left        Family History   Problem Relation Age of Onset    Diabetes Father     Breast Cancer Maternal Grandmother 60    Hypertension Paternal Grandmother     Breast Cancer Maternal Cousin Female 45      Social History:   Social History     Socioeconomic History    Marital status:    Tobacco Use    Smoking status: Never     Passive exposure: Never    Smokeless tobacco: Never   Vaping Use    Vaping status: Never Used   Substance and Sexual Activity    Alcohol use: No    Drug use: Never   Other Topics Concern    Caffeine Concern No        PHYSICAL EXAM:    Physical Exam  Constitutional:       Appearance: She is well-developed. She is not ill-appearing.   HENT:      Head:      Comments: Hearing loss     Right Ear: Tympanic membrane and ear canal normal.      Left Ear: Tympanic membrane and ear canal normal.      Mouth/Throat:      Pharynx: Oropharynx is clear.   Eyes:      Extraocular Movements: Extraocular movements intact.      Conjunctiva/sclera: Conjunctivae normal.      Pupils: Pupils are equal, round, and reactive to light.    Cardiovascular:      Rate and Rhythm: Normal rate and regular rhythm.      Heart sounds: Normal heart sounds.   Pulmonary:      Effort: Pulmonary effort is normal.      Breath sounds: Normal breath sounds.   Abdominal:      General: Bowel sounds are normal.      Palpations: Abdomen is soft.   Skin:     General: Skin is warm and dry.   Neurological:      Mental Status: She is alert.   Psychiatric:         Mood and Affect: Mood normal.     Nasal septal deviation to the right         ASSESSMENT/PLAN:   (R05.2) Subacute cough  (primary encounter diagnosis)  Plan: XR CHEST PA + LAT CHEST (CPT=71046), albuterol         (PROAIR HFA) 108 (90 Base) MCG/ACT Inhalation         Aero Soln        Postviral cough syndrome  Would rather not take  advair  Willing to try prn rescue inhaler    Pt thought  ozempic caused cough she stopped it  gaineed some weight cough persistend  She stopped thyroid med as well for the same reason  Cough persistent  Discussed to resume both meds  Cxr ordered    (J34.2) Nasal septal deviation  Plan: ENT - INTERNAL    Looking for second opinion    (Z91.09) Environmental allergies  Plan: take xyzal 5 mg q hs parn       No orders of the defined types were placed in this encounter.      Meds This Visit:  Requested Prescriptions     Signed Prescriptions Disp Refills    albuterol (PROAIR HFA) 108 (90 Base) MCG/ACT Inhalation Aero Soln 1 each 0     Sig: Inhale 2 puffs into the lungs every 4 (four) hours as needed for Wheezing.       Imaging & Referrals:  ENT - INTERNAL        ID#[id number]

## 2024-10-31 ENCOUNTER — TELEPHONE (OUTPATIENT)
Dept: CASE MANAGEMENT | Age: 59
End: 2024-10-31

## 2024-10-31 ENCOUNTER — TELEPHONE (OUTPATIENT)
Dept: INTERNAL MEDICINE CLINIC | Facility: CLINIC | Age: 59
End: 2024-10-31

## 2024-10-31 DIAGNOSIS — Z82.2 FAMILY HISTORY OF HEARING LOSS: Primary | ICD-10-CM

## 2024-10-31 NOTE — TELEPHONE ENCOUNTER
Called and informed referral was placed by Dr. Santamaria. Informed pt can view referral in mychart. No further questions or concerns from pt.

## 2024-10-31 NOTE — TELEPHONE ENCOUNTER
Dr. Santamaria,     Patient is interested in getting a cochlear implant.  Patient states she has seen Dr. Miller in the past.    Patient is requesting a referral to Kristi Appiah, an audiologist.    Pended referral please review diagnosis and sign off if you agree.    Thank you.  Lucía Fuchs  [facility]

## 2024-10-31 NOTE — TELEPHONE ENCOUNTER
Called and informed referral was placed and signed, informed pt can view referral in mychart. No further questions from pt.

## 2024-11-21 ENCOUNTER — TELEPHONE (OUTPATIENT)
Dept: INTERNAL MEDICINE CLINIC | Facility: CLINIC | Age: 59
End: 2024-11-21

## 2024-11-21 ENCOUNTER — MED REC SCAN ONLY (OUTPATIENT)
Dept: INTERNAL MEDICINE CLINIC | Facility: CLINIC | Age: 59
End: 2024-11-21

## 2024-11-21 NOTE — TELEPHONE ENCOUNTER
Patient is having a cochlear implant surgery on 1/3/24. The surgeon want's her to receive a Prevnar 20 vaccine. Patient is scheduled for her yearly physical on 12/10/24 and is asking if the physical can be her pre-op as well or does she need a separate pre-op visit?    Please Advise    RN's call her at [phone] (ok to leave a message)

## 2024-11-22 NOTE — TELEPHONE ENCOUNTER
To clarify,  do we need to change this physical to a pre-op and have her reschedule her yearly physical for a different day?     Thanks

## 2024-11-25 NOTE — TELEPHONE ENCOUNTER
Scheduling, please call patient and assist with note below to change her physical appointment to a pre-op, thanks

## 2024-11-26 ENCOUNTER — TELEPHONE (OUTPATIENT)
Dept: OBGYN CLINIC | Facility: CLINIC | Age: 59
End: 2024-11-26

## 2024-11-26 NOTE — TELEPHONE ENCOUNTER
Last annual 1/9/24 with Dr. Fountain.    Patient calling to report a dull ache in her left breast, on the left outer quadrant underneath her left armpit. This started about 1 week ago. Patient reports the ache is \"pretty constant.\"    Patient denies lumps, nipple discharge, redness, itchiness or warmth to the breast.    Patient had been planning to schedule mammogram, however the mammogram department indicated that she will need a diagnostic order d/t breast pain. Advised patient if she has a new onset of pain, we will need to evaluate her in the clinic before we are able to order imaging.    Offered patient appointments: today at 1:50 with Dr. Ackerman, Monday 12/2 at 9:40 with Siomara Hernandez, Tues 12/3 at 1:30 with Dr. Fountain, or Wed 12/4 at 1:40 with Siomara Hernandez.    Patient accepts appointment with Siomara Hernandez on 12/4.

## 2024-12-04 ENCOUNTER — OFFICE VISIT (OUTPATIENT)
Dept: OBGYN CLINIC | Facility: CLINIC | Age: 59
End: 2024-12-04
Payer: COMMERCIAL

## 2024-12-04 VITALS
DIASTOLIC BLOOD PRESSURE: 83 MMHG | WEIGHT: 163 LBS | SYSTOLIC BLOOD PRESSURE: 125 MMHG | HEART RATE: 77 BPM | BODY MASS INDEX: 29 KG/M2

## 2024-12-04 DIAGNOSIS — N64.4 BREAST PAIN, LEFT: Primary | ICD-10-CM

## 2024-12-04 PROCEDURE — 99213 OFFICE O/P EST LOW 20 MIN: CPT | Performed by: NURSE PRACTITIONER

## 2024-12-04 NOTE — PROGRESS NOTES
[facility]   Obstetrics and Gynecology    Apple Craig is a 59 year old female  Patient's last menstrual period was 2012 (approximate).   Chief Complaint   Patient presents with    Gyn Problem     BREAST PAIN   She last saw dr. Fountain in 2024. She reports having breast pain on left side x 1.5 weeks. Same area as where had her prior breast cyst aspirated. She reports no lump or nipple discharge.   Last mammogram 2023    Pap:2024 NILM, negative HPV      OBSTETRICS HISTORY:  OB History    Para Term  AB Living   1 1 1 0 0 1   SAB IAB Ectopic Multiple Live Births   0 0 0 0 1       GYNE HISTORY:  Menarche: 14 YEARS OLD (2024  1:33 PM)  Use of Birth Control (if yes, specify type): Postmenopausal (2024  1:33 PM)  Pap Date: 02/27/15 (2024  1:33 PM)  Pap Result Notes: NEG PAP / NEG HPV (2024  1:33 PM)  Follow Up Recommendation: MAMMO BILATERAL 23 BENIGN (2024  1:33 PM)      History   Sexual Activity    Sexual activity: Not Currently       MEDICAL HISTORY:  Past Medical History:    Cyst of left breast    drainage    Disorder of thyroid    Primary hypothyroidism    Sleep apnea       SOCIAL HISTORY:  Social History     Socioeconomic History    Marital status:      Spouse name: Not on file    Number of children: Not on file    Years of education: Not on file    Highest education level: Not on file   Occupational History    Not on file   Tobacco Use    Smoking status: Never     Passive exposure: Never    Smokeless tobacco: Never   Vaping Use    Vaping status: Never Used   Substance and Sexual Activity    Alcohol use: No    Drug use: Never    Sexual activity: Not Currently   Other Topics Concern     Service Not Asked    Blood Transfusions Not Asked    Caffeine Concern No    Occupational Exposure Not Asked    Hobby Hazards Not Asked    Sleep Concern Not Asked    Stress Concern Not Asked    Weight Concern Not Asked    Special Diet Not Asked    Back  Care Not Asked    Exercise Not Asked    Bike Helmet Not Asked    Seat Belt Not Asked    Self-Exams Not Asked   Social History Narrative    Not on file     Social Drivers of Health     Financial Resource Strain: Not on file   Food Insecurity: Not on file   Transportation Needs: Not on file   Physical Activity: Not on file   Stress: Not on file   Social Connections: Not on file   Housing Stability: Not on file       MEDICATIONS:    Current Outpatient Medications:     levothyroxine 112 MCG Oral Tab, Take 1 tablet (112 mcg total) by mouth before breakfast., Disp: 90 tablet, Rfl: 0    semaglutide 4 MG/3ML Subcutaneous Solution Pen-injector, Inject 1 mg into the skin once a week., Disp: , Rfl:     ALLERGIES:  Allergies[1]      Review of Systems:  Constitutional:  Denies fatigue, night sweats, hot flashes  Cardiovascular:  denies chest pain or palpitations  Respiratory:  denies shortness of breath  Gastrointestinal:  denies heartburn, abdominal pain, diarrhea or constipation  Genitourinary:  denies dysuria, incontinence, abnormal vaginal discharge, vaginal itching   Musculoskeletal:  denies back pain.  Skin/Breast:  Denies any breast pain, lumps, or discharge. +breast discomfort left side  Neurological:  denies headaches, extremity weakness or numbness.  Psychiatric: denies depression or anxiety.  Endocrine:   denies excessive thirst or urination.  Heme/Lymph:  denies history of anemia, easy bruising or bleeding.      PHYSICAL EXAM:     Vitals:    12/04/24 1334   BP: 125/83   Pulse: 77   Weight: 163 lb (73.9 kg)     Body mass index is 28.87 kg/m².     Patient offered chaperone, patient     Constitutional: well developed, well nourished  Head/Face: normocephalic  Lymphatic:no abnormal supraclavicular or axillary adenopathy is noted  Breast: left breast in LUOQ with some discomfort to area around 3 oclock; right breast normal without palpable masses, tenderness, asymmetry, nipple discharge, nipple retraction or skin  changes    Psychiatric:  Oriented to time, place, person and situation. Appropriate mood and affect    Assessment & Plan:  Apple was seen today for gyn problem.    Diagnoses and all orders for this visit:    Breast pain, left  -     DORON ELIZABETH 2D+3D DIAGNOSTIC DORON  BILAT (CPT=77066/84367); Future    Diagnostic mammogram ordered due to symptoms and patient due for screening mammogram      FREDDIE Encinas    This note was prepared using Dragon Medical voice recognition dictation software. As a result errors may occur. When identified these errors have been corrected. While every attempt is made to correct errors during dictation discrepancies may still exist.         [1] No Known Allergies

## 2024-12-07 ENCOUNTER — LAB ENCOUNTER (OUTPATIENT)
Dept: LAB | Age: 59
End: 2024-12-07
Attending: INTERNAL MEDICINE
Payer: COMMERCIAL

## 2024-12-07 DIAGNOSIS — E03.9 HYPOTHYROIDISM, UNSPECIFIED TYPE: Chronic | ICD-10-CM

## 2024-12-07 LAB
T4 FREE SERPL-MCNC: 1.5 NG/DL (ref 0.8–1.7)
TSI SER-ACNC: 0.04 UIU/ML (ref 0.55–4.78)

## 2024-12-07 PROCEDURE — 84443 ASSAY THYROID STIM HORMONE: CPT

## 2024-12-07 PROCEDURE — 84439 ASSAY OF FREE THYROXINE: CPT

## 2024-12-07 PROCEDURE — 36415 COLL VENOUS BLD VENIPUNCTURE: CPT

## 2024-12-09 ENCOUNTER — OFFICE VISIT (OUTPATIENT)
Dept: INTERNAL MEDICINE CLINIC | Facility: CLINIC | Age: 59
End: 2024-12-09
Payer: COMMERCIAL

## 2024-12-09 VITALS
BODY MASS INDEX: 28.88 KG/M2 | RESPIRATION RATE: 16 BRPM | WEIGHT: 163 LBS | HEART RATE: 84 BPM | SYSTOLIC BLOOD PRESSURE: 124 MMHG | DIASTOLIC BLOOD PRESSURE: 79 MMHG | HEIGHT: 63 IN

## 2024-12-09 DIAGNOSIS — E66.811 CLASS 1 OBESITY DUE TO EXCESS CALORIES WITHOUT SERIOUS COMORBIDITY WITH BODY MASS INDEX (BMI) OF 31.0 TO 31.9 IN ADULT: ICD-10-CM

## 2024-12-09 DIAGNOSIS — E53.8 VITAMIN B 12 DEFICIENCY: ICD-10-CM

## 2024-12-09 DIAGNOSIS — Z00.00 ANNUAL PHYSICAL EXAM: Primary | ICD-10-CM

## 2024-12-09 DIAGNOSIS — E55.9 VITAMIN D DEFICIENCY: ICD-10-CM

## 2024-12-09 DIAGNOSIS — Z23 FLU VACCINE NEED: ICD-10-CM

## 2024-12-09 DIAGNOSIS — Z12.4 SCREENING FOR CERVICAL CANCER: ICD-10-CM

## 2024-12-09 DIAGNOSIS — Z12.31 VISIT FOR SCREENING MAMMOGRAM: ICD-10-CM

## 2024-12-09 DIAGNOSIS — E03.9 HYPOTHYROIDISM, UNSPECIFIED TYPE: Chronic | ICD-10-CM

## 2024-12-09 DIAGNOSIS — E66.09 CLASS 1 OBESITY DUE TO EXCESS CALORIES WITHOUT SERIOUS COMORBIDITY WITH BODY MASS INDEX (BMI) OF 31.0 TO 31.9 IN ADULT: ICD-10-CM

## 2024-12-09 DIAGNOSIS — Z12.11 SCREENING FOR COLON CANCER: ICD-10-CM

## 2024-12-09 DIAGNOSIS — E04.2 MULTIPLE THYROID NODULES: ICD-10-CM

## 2024-12-09 PROCEDURE — 90656 IIV3 VACC NO PRSV 0.5 ML IM: CPT | Performed by: NURSE PRACTITIONER

## 2024-12-09 PROCEDURE — 90471 IMMUNIZATION ADMIN: CPT | Performed by: NURSE PRACTITIONER

## 2024-12-09 PROCEDURE — 99396 PREV VISIT EST AGE 40-64: CPT | Performed by: NURSE PRACTITIONER

## 2024-12-09 NOTE — PROGRESS NOTES
Apple Craig is a 59 year old female.  Chief Complaint   Patient presents with    Physical     HPI:   She presents for annual physical exam. She has a history of hypothyroidism, obesity, seasonal allergies and bilateral sensorineural hearing loss. She will be having surgery for a right cochlear implant on   1/3/2025.      History of B12 deficiency - she is not currently taking any supplements. Will check level with blood work.   Colonoscopy - 1/18/2024  Mammogram scheduled for 12/26/2024  PAP smear - 1/9/2024 following with OB    Flu vaccine needed   Overall she is feeling well. She denies any chest pain, SOB or fevers.   Current Outpatient Medications   Medication Sig Dispense Refill    levothyroxine 112 MCG Oral Tab Take 1 tablet (112 mcg total) by mouth before breakfast. 90 tablet 0    semaglutide 4 MG/3ML Subcutaneous Solution Pen-injector Inject 1 mg into the skin once a week.        Past Medical History:    Cyst of left breast    drainage    Disorder of thyroid    Primary hypothyroidism    Sleep apnea      Past Surgical History:   Procedure Laterality Date    Colonoscopy N/A 01/18/2024    Dr. Duarte; colon polyp    Colonoscopy N/A 1/18/2024    Procedure: COLONOSCOPY;  Surgeon: Denise Duarte MD;  Location: [facility] ENDOSCOPY    Cyst aspiration left        Social History:  Social History     Socioeconomic History    Marital status:    Tobacco Use    Smoking status: Never     Passive exposure: Never    Smokeless tobacco: Never   Vaping Use    Vaping status: Never Used   Substance and Sexual Activity    Alcohol use: No    Drug use: Never    Sexual activity: Not Currently   Other Topics Concern    Caffeine Concern No      Family History   Problem Relation Age of Onset    Diabetes Father     Breast Cancer Maternal Grandmother 60    Hypertension Paternal Grandmother     Breast Cancer Maternal Cousin Female 45      Allergies[1]     REVIEW OF SYSTEMS:     Review of Systems   Constitutional:  Negative for fever.    HENT:  Positive for hearing loss.    Respiratory:  Negative for cough, shortness of breath and wheezing.    Cardiovascular:  Negative for chest pain.   Gastrointestinal: Negative.    Genitourinary: Negative.    Musculoskeletal: Negative.    Skin: Negative.    Neurological: Negative.    Psychiatric/Behavioral: Negative.        Wt Readings from Last 5 Encounters:   12/09/24 163 lb (73.9 kg)   12/04/24 163 lb (73.9 kg)   08/26/24 158 lb (71.7 kg)   07/19/24 154 lb (69.9 kg)   02/13/24 180 lb (81.6 kg)     Body mass index is 28.87 kg/m².      EXAM:   /79 (BP Location: Right arm, Patient Position: Sitting, Cuff Size: adult)   Pulse 84   Resp 16   Ht 5' 3\" (1.6 m)   Wt 163 lb (73.9 kg)   LMP 01/01/2012 (Approximate)   BMI 28.87 kg/m²     Physical Exam  Vitals reviewed.   Constitutional:       Appearance: Normal appearance.   HENT:      Head: Normocephalic.      Right Ear: Tympanic membrane normal.      Left Ear: Tympanic membrane normal.      Nose: No congestion.   Eyes:      Extraocular Movements: Extraocular movements intact.      Pupils: Pupils are equal, round, and reactive to light.   Cardiovascular:      Rate and Rhythm: Normal rate and regular rhythm.      Pulses: Normal pulses.   Pulmonary:      Breath sounds: Normal breath sounds. No wheezing.   Abdominal:      General: Bowel sounds are normal.      Palpations: Abdomen is soft.      Tenderness: There is no abdominal tenderness.   Musculoskeletal:         General: No swelling. Normal range of motion.      Cervical back: Normal range of motion and neck supple.   Skin:     General: Skin is warm and dry.   Neurological:      Mental Status: She is alert and oriented to person, place, and time.   Psychiatric:         Mood and Affect: Mood normal.         Behavior: Behavior normal.            ASSESSMENT AND PLAN:   1. Annual physical exam  - CBC With Differential With Platelet; Future  - Comp Metabolic Panel (14); Future  - Lipid Panel [E]; Future  -  Urinalysis with Culture Reflex    2. Hypothyroidism, unspecified type  - CPM  - levo 112mcg daily  - per patient she is taking the medication daily and is not taking any supplements such as biotin    3. Vitamin B 12 deficiency  - Vitamin B12 [E]; Future    4. Vitamin D deficiency  - Vitamin D; Future    5. Visit for screening mammogram  - scheduled for mammogram on 12/26/2024    6. Screening for colon cancer  - colonoscopy completed 1/2024  - repeat 1/2029    7. Flu vaccine need  - Fluzone trivalent vaccine, PF 0.5mL, 6mo+ (30480)    8. Screening for cervical cancer  - PAP smear completed 1/2024  - repeat 1/2027    9. Class 1 obesity due to excess calories without serious comorbidity with body mass index (BMI) of 31.0 to 31.9 in adult  - currently on ozempic   - tolerating current dose   - continue to monitor diet and exercise as tolerated     10. Multiple thyroid nodules   - US completed on 12/19/2023  - recommended 12 month follow up  - order placed for repeat US      The patient indicates understanding of these issues and agrees to the plan.  Return in about 1 year (around 12/9/2025).         [1] No Known Allergies

## 2024-12-10 ENCOUNTER — TELEPHONE (OUTPATIENT)
Dept: INTERNAL MEDICINE CLINIC | Facility: CLINIC | Age: 59
End: 2024-12-10

## 2024-12-10 ENCOUNTER — OFFICE VISIT (OUTPATIENT)
Dept: INTERNAL MEDICINE CLINIC | Facility: CLINIC | Age: 59
End: 2024-12-10

## 2024-12-10 ENCOUNTER — EKG ENCOUNTER (OUTPATIENT)
Dept: LAB | Age: 59
End: 2024-12-10
Attending: INTERNAL MEDICINE
Payer: COMMERCIAL

## 2024-12-10 ENCOUNTER — LAB ENCOUNTER (OUTPATIENT)
Dept: LAB | Age: 59
End: 2024-12-10
Attending: INTERNAL MEDICINE
Payer: COMMERCIAL

## 2024-12-10 VITALS
BODY MASS INDEX: 28.88 KG/M2 | DIASTOLIC BLOOD PRESSURE: 82 MMHG | HEIGHT: 63 IN | SYSTOLIC BLOOD PRESSURE: 130 MMHG | WEIGHT: 163 LBS | HEART RATE: 69 BPM

## 2024-12-10 DIAGNOSIS — Z01.818 PREOP EXAM FOR INTERNAL MEDICINE: ICD-10-CM

## 2024-12-10 DIAGNOSIS — Z00.00 ANNUAL PHYSICAL EXAM: ICD-10-CM

## 2024-12-10 DIAGNOSIS — E55.9 VITAMIN D DEFICIENCY: ICD-10-CM

## 2024-12-10 DIAGNOSIS — E53.8 VITAMIN B 12 DEFICIENCY: ICD-10-CM

## 2024-12-10 DIAGNOSIS — E03.9 HYPOTHYROIDISM, UNSPECIFIED TYPE: ICD-10-CM

## 2024-12-10 DIAGNOSIS — Z01.818 PREOP EXAM FOR INTERNAL MEDICINE: Primary | ICD-10-CM

## 2024-12-10 LAB
ALBUMIN SERPL-MCNC: 4.6 G/DL (ref 3.2–4.8)
ALBUMIN/GLOB SERPL: 1.3 {RATIO} (ref 1–2)
ALP LIVER SERPL-CCNC: 88 U/L
ALT SERPL-CCNC: 30 U/L
ANION GAP SERPL CALC-SCNC: 8 MMOL/L (ref 0–18)
APTT PPP: 28 SECONDS (ref 23–36)
AST SERPL-CCNC: 24 U/L (ref ?–34)
ATRIAL RATE: 72 BPM
BASOPHILS # BLD AUTO: 0.01 X10(3) UL (ref 0–0.2)
BASOPHILS NFR BLD AUTO: 0.3 %
BILIRUB SERPL-MCNC: 0.4 MG/DL (ref 0.3–1.2)
BILIRUB UR QL: NEGATIVE
BUN BLD-MCNC: 18 MG/DL (ref 9–23)
BUN/CREAT SERPL: 26.9 (ref 10–20)
CALCIUM BLD-MCNC: 10.1 MG/DL (ref 8.7–10.4)
CHLORIDE SERPL-SCNC: 108 MMOL/L (ref 98–112)
CHOLEST SERPL-MCNC: 211 MG/DL (ref ?–200)
CO2 SERPL-SCNC: 28 MMOL/L (ref 21–32)
COLOR UR: YELLOW
CREAT BLD-MCNC: 0.67 MG/DL
DEPRECATED RDW RBC AUTO: 43.3 FL (ref 35.1–46.3)
EGFRCR SERPLBLD CKD-EPI 2021: 101 ML/MIN/1.73M2 (ref 60–?)
EOSINOPHIL # BLD AUTO: 0.03 X10(3) UL (ref 0–0.7)
EOSINOPHIL NFR BLD AUTO: 0.9 %
ERYTHROCYTE [DISTWIDTH] IN BLOOD BY AUTOMATED COUNT: 13.1 % (ref 11–15)
FASTING PATIENT LIPID ANSWER: YES
FASTING STATUS PATIENT QL REPORTED: YES
GLOBULIN PLAS-MCNC: 3.6 G/DL (ref 2–3.5)
GLUCOSE BLD-MCNC: 89 MG/DL (ref 70–99)
GLUCOSE UR-MCNC: NORMAL MG/DL
HCT VFR BLD AUTO: 38.3 %
HDLC SERPL-MCNC: 74 MG/DL (ref 40–59)
HGB BLD-MCNC: 12.6 G/DL
IMM GRANULOCYTES # BLD AUTO: 0.01 X10(3) UL (ref 0–1)
IMM GRANULOCYTES NFR BLD: 0.3 %
INR BLD: 0.89 (ref 0.8–1.2)
KETONES UR-MCNC: NEGATIVE MG/DL
LDLC SERPL CALC-MCNC: 126 MG/DL (ref ?–100)
LEUKOCYTE ESTERASE UR QL STRIP.AUTO: NEGATIVE
LYMPHOCYTES # BLD AUTO: 1.14 X10(3) UL (ref 1–4)
LYMPHOCYTES NFR BLD AUTO: 34.8 %
MCH RBC QN AUTO: 30.1 PG (ref 26–34)
MCHC RBC AUTO-ENTMCNC: 32.9 G/DL (ref 31–37)
MCV RBC AUTO: 91.6 FL
MONOCYTES # BLD AUTO: 0.31 X10(3) UL (ref 0.1–1)
MONOCYTES NFR BLD AUTO: 9.5 %
NEUTROPHILS # BLD AUTO: 1.78 X10 (3) UL (ref 1.5–7.7)
NEUTROPHILS # BLD AUTO: 1.78 X10(3) UL (ref 1.5–7.7)
NEUTROPHILS NFR BLD AUTO: 54.2 %
NITRITE UR QL STRIP.AUTO: NEGATIVE
NONHDLC SERPL-MCNC: 137 MG/DL (ref ?–130)
OSMOLALITY SERPL CALC.SUM OF ELEC: 299 MOSM/KG (ref 275–295)
P AXIS: 53 DEGREES
P-R INTERVAL: 156 MS
PH UR: 5.5 [PH] (ref 5–8)
PLATELET # BLD AUTO: 183 10(3)UL (ref 150–450)
POTASSIUM SERPL-SCNC: 4.6 MMOL/L (ref 3.5–5.1)
PROT SERPL-MCNC: 8.2 G/DL (ref 5.7–8.2)
PROTHROMBIN TIME: 12.6 SECONDS (ref 11.6–14.8)
Q-T INTERVAL: 382 MS
QRS DURATION: 98 MS
QTC CALCULATION (BEZET): 418 MS
R AXIS: 16 DEGREES
RBC # BLD AUTO: 4.18 X10(6)UL
SODIUM SERPL-SCNC: 144 MMOL/L (ref 136–145)
SP GR UR STRIP: 1.02 (ref 1–1.03)
T AXIS: 25 DEGREES
TRIGL SERPL-MCNC: 63 MG/DL (ref 30–149)
UROBILINOGEN UR STRIP-ACNC: NORMAL
VENTRICULAR RATE: 72 BPM
VIT B12 SERPL-MCNC: 348 PG/ML (ref 211–911)
VIT D+METAB SERPL-MCNC: 30.3 NG/ML (ref 30–100)
VLDLC SERPL CALC-MCNC: 11 MG/DL (ref 0–30)
WBC # BLD AUTO: 3.3 X10(3) UL (ref 4–11)

## 2024-12-10 PROCEDURE — 93010 ELECTROCARDIOGRAM REPORT: CPT | Performed by: INTERNAL MEDICINE

## 2024-12-10 PROCEDURE — 82607 VITAMIN B-12: CPT

## 2024-12-10 PROCEDURE — 99214 OFFICE O/P EST MOD 30 MIN: CPT | Performed by: INTERNAL MEDICINE

## 2024-12-10 PROCEDURE — 80053 COMPREHEN METABOLIC PANEL: CPT

## 2024-12-10 PROCEDURE — 85730 THROMBOPLASTIN TIME PARTIAL: CPT

## 2024-12-10 PROCEDURE — 80061 LIPID PANEL: CPT

## 2024-12-10 PROCEDURE — 93005 ELECTROCARDIOGRAM TRACING: CPT

## 2024-12-10 PROCEDURE — 87641 MR-STAPH DNA AMP PROBE: CPT

## 2024-12-10 PROCEDURE — 85025 COMPLETE CBC W/AUTO DIFF WBC: CPT

## 2024-12-10 PROCEDURE — 85610 PROTHROMBIN TIME: CPT

## 2024-12-10 PROCEDURE — 36415 COLL VENOUS BLD VENIPUNCTURE: CPT

## 2024-12-10 PROCEDURE — 81001 URINALYSIS AUTO W/SCOPE: CPT

## 2024-12-10 PROCEDURE — 82306 VITAMIN D 25 HYDROXY: CPT

## 2024-12-10 RX ORDER — LEVOTHYROXINE SODIUM 100 UG/1
100 TABLET ORAL DAILY
Qty: 90 TABLET | Refills: 0 | Status: SHIPPED | OUTPATIENT
Start: 2024-12-10 | End: 2025-12-05

## 2024-12-10 NOTE — PROGRESS NOTES
HPI:    Patient ID: Apple Craig is a 59 year old female.    HPI    Apple Craig presents for preoperative clearance for: Right Cochlear Implant   Performing Physician:Fermin Corley MD  Neurotology  NPI: [id number]  [address] [address]     Phone: [phone]  Fax: [phone]  Date of surgery: 1/3/2025   Elective or emergency: elective    Cervical/neck:   - Arthritis: no  - Neck pain: no  - Difficulty with ROM: no  - Prior neck injury/surgery: no    Cardiac:  - History of ischemic coronary disease: no  - History of heart failure: no  - Heart attack in past 90 days: no  - Chest pain in last 90 days: no  - History of Arrhythmia:  no  - History of stroke or TIA:no  - Diabetes/A1C: Last A1c value was 5.3% done 5/8/2024.      - Most recent echo/Stress test: none      Pulmonary:   - Smoker: no  - Apnea/CPAP:no  - Asthma/COPD:no  - Difficulty laying flat for extended period of time: no  - Dyspnea/exertional: no      Functional Capacity:   Perform ADLs- eating, dress, toilet (1 METs)?yes  Walk up flight of steps, hill, walk ground level 3-4mph (4 METs)? yes  Perform heavy housework- scrubbing floors, move heavy furniture, climb 2 flights of stairs (4-10 METs)?yes  Participate in strenuous sports- swimming, singles tennis, football, basketball, ski (+10 METs)?no    Risk Assessment Tools:  body mass index is 28.87 kg/m².     The patient's ASCVD 10 year risk score is 2.6.         /82 (BP Location: Left arm, Patient Position: Sitting, Cuff Size: adult)   Pulse 69   Ht 5' 3\" (1.6 m)   Wt 163 lb (73.9 kg)   LMP 01/01/2012 (Approximate)   BMI 28.87 kg/m²   Wt Readings from Last 6 Encounters:   12/10/24 163 lb (73.9 kg)   12/09/24 163 lb (73.9 kg)   12/04/24 163 lb (73.9 kg)   08/26/24 158 lb (71.7 kg)   07/19/24 154 lb (69.9 kg)   02/13/24 180 lb (81.6 kg)     Body mass index is 28.87 kg/m².  HGBA1C:    Lab Results   Component Value Date    A1C 5.3 05/08/2024    A1C 5.5 12/09/2023     A1C 5.4 12/03/2022     05/08/2024         Review of Systems   Constitutional:  Negative for activity change, chills, fatigue and fever.   HENT:  Positive for hearing loss and sinus pressure (mild chronic). Negative for ear discharge, nosebleeds, postnasal drip, rhinorrhea and sore throat.    Eyes:  Negative for pain, discharge and redness.   Respiratory:  Negative for cough, chest tightness, shortness of breath and wheezing.    Cardiovascular:  Negative for chest pain, palpitations and leg swelling.   Gastrointestinal:  Negative for abdominal pain, blood in stool, constipation, diarrhea, nausea and vomiting.   Genitourinary:  Negative for difficulty urinating, dysuria, frequency, hematuria and urgency.   Musculoskeletal:  Negative for back pain, gait problem and joint swelling.   Skin:  Negative for rash.   Neurological:  Negative for syncope, weakness, light-headedness and headaches.   Psychiatric/Behavioral:  Negative for dysphoric mood. The patient is not nervous/anxious.          Current Outpatient Medications   Medication Sig Dispense Refill    levothyroxine 112 MCG Oral Tab Take 1 tablet (112 mcg total) by mouth before breakfast. 90 tablet 0    semaglutide 4 MG/3ML Subcutaneous Solution Pen-injector Inject 1 mg into the skin once a week.       Allergies:Allergies[1]    HISTORY:  Past Medical History:    Cyst of left breast    drainage    Disorder of thyroid    Primary hypothyroidism    Sleep apnea      Past Surgical History:   Procedure Laterality Date    Colonoscopy N/A 01/18/2024    Dr. Duarte; colon polyp    Colonoscopy N/A 1/18/2024    Procedure: COLONOSCOPY;  Surgeon: Denise Duarte MD;  Location: [facility] ENDOSCOPY    Cyst aspiration left        Family History   Problem Relation Age of Onset    Diabetes Father     Breast Cancer Maternal Grandmother 60    Hypertension Paternal Grandmother     Breast Cancer Maternal Cousin Female 45      Social History:   Social History     Socioeconomic History     Marital status:    Tobacco Use    Smoking status: Never     Passive exposure: Never    Smokeless tobacco: Never   Vaping Use    Vaping status: Never Used   Substance and Sexual Activity    Alcohol use: No    Drug use: Never    Sexual activity: Not Currently   Other Topics Concern    Caffeine Concern No        PHYSICAL EXAM:    Physical Exam  Constitutional:       General: She is not in acute distress.     Appearance: She is well-developed. She is not diaphoretic.   HENT:      Head: Normocephalic and atraumatic.      Right Ear: Ear canal normal.      Left Ear: Ear canal normal.      Ears:      Comments: Hearing loss     Nose: Nose normal.      Mouth/Throat:      Pharynx: No oropharyngeal exudate or posterior oropharyngeal erythema.   Eyes:      General: No scleral icterus.        Right eye: No discharge.         Left eye: No discharge.      Conjunctiva/sclera: Conjunctivae normal.      Pupils: Pupils are equal, round, and reactive to light.   Cardiovascular:      Rate and Rhythm: Normal rate and regular rhythm.      Heart sounds: Normal heart sounds. No murmur heard.  Pulmonary:      Effort: Pulmonary effort is normal. No respiratory distress.      Breath sounds: Normal breath sounds. No wheezing.   Abdominal:      General: Bowel sounds are normal.      Palpations: Abdomen is soft. There is no mass.      Tenderness: There is no abdominal tenderness. There is no guarding or rebound.      Hernia: No hernia is present.   Musculoskeletal:         General: No tenderness.   Skin:     General: Skin is warm and dry.      Findings: No lesion or rash.   Neurological:      Mental Status: She is alert.      Gait: Gait normal.   Psychiatric:         Behavior: Behavior normal.         Thought Content: Thought content normal.           8/26/2024  Procedure: XR CHEST PA + LAT CHEST (CPT=71046)  No acute cardiopulmonary abnormality.     -----------------------------------------------------------------------------------------------------------------------------------------------                  Normal sinus rhythm  Incomplete right bundle branch block  Borderline ECG  No previous ECGs found in [facility]  Confirmed by EDILSON CENTENO JOHN ([id number]) on 12/4/2023 6:48:40 AM     Specimen Collected: 12/01/23  5:25 PM          Component      Latest Ref Rng 12/10/2024   WBC      4.0 - 11.0 x10(3) uL 3.3 (L)    RBC      3.80 - 5.30 x10(6)uL 4.18    Hemoglobin      12.0 - 16.0 g/dL 12.6    Hematocrit      35.0 - 48.0 % 38.3    MCV      80.0 - 100.0 fL 91.6    MCH      26.0 - 34.0 pg 30.1    MCHC      31.0 - 37.0 g/dL 32.9    RDW-SD      35.1 - 46.3 fL 43.3    RDW      11.0 - 15.0 % 13.1    Platelet Count      150.0 - 450.0 10(3)uL 183.0    Prelim Neutrophil Abs      1.50 - 7.70 x10 (3) uL 1.78    Neutrophils Absolute      1.50 - 7.70 x10(3) uL 1.78    Lymphocytes Absolute      1.00 - 4.00 x10(3) uL 1.14    Monocytes Absolute      0.10 - 1.00 x10(3) uL 0.31    Eosinophils Absolute      0.00 - 0.70 x10(3) uL 0.03    Basophils Absolute      0.00 - 0.20 x10(3) uL 0.01    Immature Granulocyte Absolute      0.00 - 1.00 x10(3) uL 0.01    Neutrophils %      % 54.2    Lymphocytes %      % 34.8    Monocytes %      % 9.5    Eosinophils %      % 0.9    Basophils %      % 0.3    Immature Granulocyte %      % 0.3    Glucose      70 - 99 mg/dL 89    Sodium      136 - 145 mmol/L 144    Potassium      3.5 - 5.1 mmol/L 4.6    Chloride      98 - 112 mmol/L 108    Carbon Dioxide, Total      21.0 - 32.0 mmol/L 28.0    ANION GAP      0 - 18 mmol/L 8    BUN      9 - 23 mg/dL 18    CREATININE      0.55 - 1.02 mg/dL 0.67    BUN/CREATININE RATIO      10.0 - 20.0  26.9 (H)    CALCIUM      8.7 - 10.4 mg/dL 10.1    CALCULATED OSMOLALITY      275 - 295 mOsm/kg 299 (H)    EGFR      >=60 mL/min/1.73m2 101    ALT (SGPT)      10 - 49 U/L 30    AST (SGOT)      <34 U/L 24    ALKALINE PHOSPHATASE       46 - 118 U/L 88    Total Bilirubin      0.3 - 1.2 mg/dL 0.4    PROTEIN, TOTAL      5.7 - 8.2 g/dL 8.2    Albumin      3.2 - 4.8 g/dL 4.6    Globulin      2.0 - 3.5 g/dL 3.6 (H)    A/G Ratio      1.0 - 2.0  1.3    Patient Fasting for CMP? Yes    Color Urine      Yellow  Yellow    Clarity Urine      Clear  Ex.Turbid !    Spec Gravity      1.005 - 1.030  1.025    Glucose Urine      Normal mg/dL Normal    Bilirubin Urine      Negative  Negative    Ketones, UA      Negative mg/dL Negative    Blood Urine      Negative  1+ !    PH Urine      5.0 - 8.0  5.5    Protein Urine      Negative mg/dL Trace !    Urobilinogen Urine      Normal  Normal    Nitrite Urine      Negative  Negative    Leukocyte Esterase       Negative  Negative    WBC Urine      0 - 5 /HPF None Seen    RBC Urine      0 - 2 /HPF None Seen    Bacteria Urine      None Seen /HPF None Seen    SQUAM EPI CELLS UR      None Seen /HPF None Seen    RENAL TUBULAR EPITHELIAL CELLS      None Seen /HPF None Seen    TRANSITIONAL EPI CELLS      None Seen /HPF None Seen    YEAST URINE      None Seen /HPF None Seen    Cholesterol, Total      <200 mg/dL 211 (H)    HDL Cholesterol      40 - 59 mg/dL 74 (H)    Triglycerides      30 - 149 mg/dL 63    LDL Cholesterol Calc      <100 mg/dL 126 (H)    VLDL      0 - 30 mg/dL 11    NON-HDL CHOLESTEROL      <130 mg/dL 137 (H)    Patient Fasting for Lipid? Yes    PT      11.6 - 14.8 seconds 12.6    INR      0.80 - 1.20  0.89    Vitamin B12      211 - 911 pg/mL 348    APTT      23.0 - 36.0 seconds 28.0    VITAMIN D, 25-OH, TOTAL      30.0 - 100.0 ng/mL 30.3       Legend:  (L) Low  (H) High  ! Abnormal  Normal sinus rhythm with sinus arrhythmia  Normal ECG  When compared with ECG of 01-DEC-2023 17:25,  Incomplete right bundle branch block is no longer Present  Confirmed by EDILSON NORMAN, RENO ([id number]) on 12/10/2024 2:05:57 PM     Specimen Collected: 12/10/24 10:18 AM       ASSESSMENT/PLAN:     (Z01.818) Preop exam for internal medicine   (primary encounter diagnosis)  Plan: CBC With Differential With Platelet,         Prothrombin Time (PT), PTT, Activated,         Urinalysis with Culture Reflex, MRSA Screen by         PCR, EKG 12 Lead, CANCELED: Comp Metabolic         Panel (14)      She has  4 to 10 METS  of activity.    She  has no know cardiovascular or pulmonary disease   Given the information avalable int he above notes patient my proceed with surgical intervention as long as the benefit outweighs the risks at the time of the procedure.   Pre OP test results wihtin normal limti   Blood work  EKG  and urinalysis    TSh slightly low   will decrease levothyroxine from 112 to 100 mcg daily    Based on history physical exam and test results    Patient is cleared to proceed with surgery as planned    (E03.9) Hypothyroidism, unspecified type  Plan:lower levothyroxine dose    Patient voiced understanding  and agrees with plan            Orders Placed This Encounter   Procedures    CBC With Differential With Platelet    Comp Metabolic Panel (14)    Prothrombin Time (PT)    PTT, Activated    Urinalysis with Culture Reflex       Meds This Visit:  Requested Prescriptions      No prescriptions requested or ordered in this encounter       Imaging & Referrals:  None        ID#[id number]           [1] No Known Allergies

## 2024-12-10 NOTE — TELEPHONE ENCOUNTER
Patient calling ( name and date of birth of patient verified  states had pre-op visit today    Surgery is 1/3/2025 with Anastasia    Patient is requesting to  fax pre-op notes , clearance etc. To Dr Corley at [phone]      [facility] staff please assist and thank you

## 2024-12-11 LAB — MRSA DNA SPEC QL NAA+PROBE: NEGATIVE

## 2024-12-12 ENCOUNTER — PATIENT MESSAGE (OUTPATIENT)
Dept: INTERNAL MEDICINE CLINIC | Facility: CLINIC | Age: 59
End: 2024-12-12

## 2024-12-16 RX ORDER — ERGOCALCIFEROL 1.25 MG/1
50000 CAPSULE, LIQUID FILLED ORAL
Qty: 3 CAPSULE | Refills: 3 | Status: SHIPPED | OUTPATIENT
Start: 2024-12-16

## 2024-12-18 ENCOUNTER — TELEPHONE (OUTPATIENT)
Dept: INTERNAL MEDICINE CLINIC | Facility: CLINIC | Age: 59
End: 2024-12-18

## 2024-12-18 NOTE — TELEPHONE ENCOUNTER
Patient is requesting her pre-op clearance information be faxed to the provider    Dr. Fermin Corley     Cochlear Implant    [facility]   [phone]    Please call patient information is confirmed faxed.

## 2024-12-26 ENCOUNTER — HOSPITAL ENCOUNTER (OUTPATIENT)
Dept: MAMMOGRAPHY | Facility: HOSPITAL | Age: 59
Discharge: HOME | End: 2024-12-26
Attending: NURSE PRACTITIONER
Payer: COMMERCIAL

## 2024-12-26 ENCOUNTER — NURSE TRIAGE (OUTPATIENT)
Dept: INTERNAL MEDICINE CLINIC | Facility: CLINIC | Age: 59
End: 2024-12-26

## 2024-12-26 DIAGNOSIS — N64.4 BREAST PAIN, LEFT: ICD-10-CM

## 2024-12-26 PROCEDURE — 76642 ULTRASOUND BREAST LIMITED: CPT | Performed by: NURSE PRACTITIONER

## 2024-12-26 PROCEDURE — 77062 BREAST TOMOSYNTHESIS BI: CPT | Performed by: NURSE PRACTITIONER

## 2024-12-26 PROCEDURE — 77066 DX MAMMO INCL CAD BI: CPT | Performed by: NURSE PRACTITIONER

## 2024-12-26 NOTE — TELEPHONE ENCOUNTER
Action Requested: Summary for Provider     []  Critical Lab, Recommendations Needed  [] Need Additional Advice  [x]   FYI    []   Need Orders  [] Need Medications Sent to Pharmacy  []  Other     SUMMARY: Visit tomorrow, with Dr Thomas    Reason for call: MRI finding and Symptoms  Onset: for the past week or so       Patient called office. Date of birth and full name both confirmed.     Completed MRI with [facility] - see Care Everywhere.     MRI IAC Cranial Nerve W WO Contrast  12/23/24     Per MRI under \"Impression\":     \"Small amount of layering fluid in the right sphenoid sinus. Correlate for acute sinusitis. \"    Patient reports right sinus congestion for some time.   Asking for an antibiotic, prior to 1/3/24 Chochlear Implant Surgery . Wants to avoid any other complications.     Triaged and advised care advice     Evaluation advised , Appointment made.   RN offered sooner appointment today, but due to work, will see a provider tomorrow.       Future Appointments   Date Time Provider Department Center   12/26/2024  2:40 PM [facility]   12/27/2024 11:40 AM Fe Thomas MD [id number]  [place]             Reason for Disposition   Sinus congestion (pressure, fullness) present > 10 days    Protocols used: Sinus Pain or Congestion-A-OH

## 2024-12-27 ENCOUNTER — OFFICE VISIT (OUTPATIENT)
Dept: INTERNAL MEDICINE CLINIC | Facility: CLINIC | Age: 59
End: 2024-12-27

## 2024-12-27 VITALS
WEIGHT: 166 LBS | DIASTOLIC BLOOD PRESSURE: 85 MMHG | SYSTOLIC BLOOD PRESSURE: 130 MMHG | HEART RATE: 80 BPM | BODY MASS INDEX: 29.41 KG/M2 | TEMPERATURE: 98 F | HEIGHT: 63 IN

## 2024-12-27 DIAGNOSIS — J01.30 ACUTE NON-RECURRENT SPHENOIDAL SINUSITIS: Primary | ICD-10-CM

## 2024-12-27 PROCEDURE — 99213 OFFICE O/P EST LOW 20 MIN: CPT | Performed by: INTERNAL MEDICINE

## 2024-12-27 NOTE — PROGRESS NOTES
Apple Craig is a 59 year old female.  Chief Complaint   Patient presents with    Follow - Up     MRI results, sinus congestion      HPI:     Patient is a 59-year-old female with history of sensorineural hearing loss.  She is scheduled for cochlear implant surgery on January 3, 2025.  She had an MRI of cranial nerves completed which showed sphenoid sinus situs and she wants to be treated for the sinusitis because of concerns of infection.  States she does feel congestion right under her eyes with some headaches no runny nose no postnasal drip no earaches.  No fever.    Current Outpatient Medications   Medication Sig Dispense Refill    amoxicillin clavulanate 875-125 MG Oral Tab Take 1 tablet by mouth 2 (two) times daily for 10 days. 20 tablet 0    ergocalciferol 1.25 MG (81140 UT) Oral Cap Take 1 capsule (50,000 Units total) by mouth every 30 (thirty) days. 3 capsule 3    levothyroxine 100 MCG Oral Tab Take 1 tablet (100 mcg total) by mouth daily. 90 tablet 0    semaglutide 4 MG/3ML Subcutaneous Solution Pen-injector Inject 1 mg into the skin once a week.        Past Medical History:    Cyst of left breast    drainage    Disorder of thyroid    Primary hypothyroidism    Sleep apnea      Past Surgical History:   Procedure Laterality Date    Colonoscopy N/A 01/18/2024    Dr. Duarte; colon polyp    Colonoscopy N/A 1/18/2024    Procedure: COLONOSCOPY;  Surgeon: Denise Duarte MD;  Location: [facility]    Cyst aspiration left        Social History:  Social History     Socioeconomic History    Marital status:    Tobacco Use    Smoking status: Never     Passive exposure: Never    Smokeless tobacco: Never   Vaping Use    Vaping status: Never Used   Substance and Sexual Activity    Alcohol use: No    Drug use: Never    Sexual activity: Not Currently   Other Topics Concern    Caffeine Concern No      Family History   Problem Relation Age of Onset    Diabetes Father     Breast Cancer Maternal Grandmother 60     Hypertension Paternal Grandmother     Breast Cancer Maternal Cousin Female 45      Allergies[1]     REVIEW OF SYSTEMS:   Review of Systems   Review of Systems   Constitutional: Negative for activity change, appetite change and fever.   HENT: Negative for congestion and voice change.    Respiratory: Negative for cough and shortness of breath.    Cardiovascular: Negative for chest pain.   Gastrointestinal: Negative for abdominal distention, abdominal pain and vomiting.   Genitourinary: Negative for hematuria.   Skin: Negative for wound.   Psychiatric/Behavioral: Negative for behavioral problems.   Wt Readings from Last 5 Encounters:   12/27/24 166 lb (75.3 kg)   12/10/24 163 lb (73.9 kg)   12/09/24 163 lb (73.9 kg)   12/04/24 163 lb (73.9 kg)   08/26/24 158 lb (71.7 kg)     Body mass index is 29.41 kg/m².      EXAM:   /85   Pulse 80   Temp 97.6 °F (36.4 °C) (Temporal)   Ht 5' 3\" (1.6 m)   Wt 166 lb (75.3 kg)   LMP 01/01/2012 (Approximate)   BMI 29.41 kg/m²   Physical Exam   Constitutional:       Appearance: Normal appearance.   HENT:      Head: Normocephalic.   Eyes:      Conjunctiva/sclera: Conjunctivae normal.   Cardiovascular:      Rate and Rhythm: Normal rate and regular rhythm.      Heart sounds: Normal heart sounds. No murmur heard.  Pulmonary:      Effort: Pulmonary effort is normal.      Breath sounds: Normal breath sounds. No rhonchi or rales.   Abdominal:      General: Bowel sounds are normal.      Palpations: Abdomen is soft.      Tenderness: There is no abdominal tenderness.   Musculoskeletal:      Cervical back: Neck supple.      Right lower leg: No edema.      Left lower leg: No edema.   Skin:     General: Skin is warm and dry.   Neurological:      General: No focal deficit present.      Mental Status: He is alert and oriented to person, place, and time. Mental status is at baseline.   Psychiatric:         Mood and Affect: Mood normal.         Behavior: Behavior normal.       ASSESSMENT AND  PLAN:   1. Acute non-recurrent sphenoidal sinusitis  - start augmentin   - tylenol as needed  - saline rinses if needed      The patient indicates understanding of these issues and agrees to the plan.      Fe Thomas MD        [1] No Known Allergies

## 2024-12-29 ENCOUNTER — TELEPHONE (OUTPATIENT)
Dept: INTERNAL MEDICINE CLINIC | Facility: CLINIC | Age: 59
End: 2024-12-29

## 2024-12-30 ENCOUNTER — TELEPHONE (OUTPATIENT)
Dept: OBGYN CLINIC | Facility: CLINIC | Age: 59
End: 2024-12-30

## 2024-12-30 NOTE — TELEPHONE ENCOUNTER
Faxed this note, Pre Op exam 12/10/2024. Labs, EKG, chest xray to Dr Corley.  [facility] nursing staff, please schedule patient for Prevnar immunization ASAP.    Confirmed that all faxes were completed.

## 2024-12-30 NOTE — TELEPHONE ENCOUNTER
Patient said she was going to hold off on the Prevnar 20 immunization until she sees her neurologist in the morning, then will decide on coming in.

## 2024-12-30 NOTE — TELEPHONE ENCOUNTER
FAX progress notes with ADDENDUM to neurosurgery      I called pt re her Pre op clearance  She is on levothyroxine 100 mcg daily   Taking it appropriately by itself on an empty stomach 1 hour prior to breakfast  Dose was lowered on 12/10/2024  She will repeat the TSH on 1/24/2025    Prevnar 20 ordered    She will call for an RN visit tomorrow to get prevnar 20    EKG  normal  Denies cardiac symptoms    The 10-year ASCVD risk score (Moncho NAVAS, et al., 2019) is: 2.6%    Values used to calculate the score:      Age: 59 years      Sex: Female      Is Non- : No      Diabetic: No      Tobacco smoker: No      Systolic Blood Pressure: 130 mmHg      Is BP treated: No      HDL Cholesterol: 74 mg/dL      Total Cholesterol: 211 mg/dL  No premature CAD in family    Ethmoid sinusitis by MRI   seen in the office 12/27 by DR Vasquez  started on Augmentin for 10 days    All labs and EKG discussed with patient  Patient voiced understanding  and agrees with plan      NOTE  please assist in pt getting prevnar 20 tomorrow

## 2024-12-30 NOTE — TELEPHONE ENCOUNTER
----- Message from Siomara Hernandez sent at 12/27/2024  4:38 PM CST -----  Normal mammogram. Recommend to see [facility] due to her report and they can evaluate breast pain if still present    Siomara Hernandez, APRN

## 2025-02-05 ENCOUNTER — NURSE ONLY (OUTPATIENT)
Dept: INTERNAL MEDICINE CLINIC | Facility: CLINIC | Age: 60
End: 2025-02-05
Payer: COMMERCIAL

## 2025-02-05 DIAGNOSIS — E53.8 VITAMIN B 12 DEFICIENCY: Primary | ICD-10-CM

## 2025-02-05 PROCEDURE — 96372 THER/PROPH/DIAG INJ SC/IM: CPT | Performed by: INTERNAL MEDICINE

## 2025-02-05 PROCEDURE — 90677 PCV20 VACCINE IM: CPT | Performed by: INTERNAL MEDICINE

## 2025-02-05 PROCEDURE — 90471 IMMUNIZATION ADMIN: CPT | Performed by: INTERNAL MEDICINE

## 2025-02-05 RX ADMIN — CYANOCOBALAMIN 1000 MCG: 1000 INJECTION, SOLUTION INTRAMUSCULAR; SUBCUTANEOUS at 13:02:00

## 2025-02-08 ENCOUNTER — LAB ENCOUNTER (OUTPATIENT)
Dept: LAB | Age: 60
End: 2025-02-08
Attending: INTERNAL MEDICINE
Payer: COMMERCIAL

## 2025-02-08 DIAGNOSIS — E03.9 HYPOTHYROIDISM, UNSPECIFIED TYPE: ICD-10-CM

## 2025-02-08 LAB
T3 SERPL-MCNC: 1.08 NG/ML (ref 0.6–1.81)
T4 FREE SERPL-MCNC: 1.7 NG/DL (ref 0.8–1.7)
TSI SER-ACNC: 0.33 UIU/ML (ref 0.55–4.78)

## 2025-02-08 PROCEDURE — 36415 COLL VENOUS BLD VENIPUNCTURE: CPT

## 2025-02-08 PROCEDURE — 84439 ASSAY OF FREE THYROXINE: CPT

## 2025-02-08 PROCEDURE — 84480 ASSAY TRIIODOTHYRONINE (T3): CPT

## 2025-02-08 PROCEDURE — 84443 ASSAY THYROID STIM HORMONE: CPT

## 2025-03-05 ENCOUNTER — OFFICE VISIT (OUTPATIENT)
Dept: INTERNAL MEDICINE CLINIC | Facility: CLINIC | Age: 60
End: 2025-03-05
Payer: COMMERCIAL

## 2025-03-05 VITALS
TEMPERATURE: 98 F | BODY MASS INDEX: 30.16 KG/M2 | SYSTOLIC BLOOD PRESSURE: 118 MMHG | OXYGEN SATURATION: 98 % | HEART RATE: 102 BPM | DIASTOLIC BLOOD PRESSURE: 72 MMHG | WEIGHT: 170.19 LBS | HEIGHT: 63 IN

## 2025-03-05 DIAGNOSIS — J11.1 INFLUENZA-LIKE ILLNESS: ICD-10-CM

## 2025-03-05 DIAGNOSIS — U07.1 LAB TEST POSITIVE FOR DETECTION OF COVID-19 VIRUS: Primary | ICD-10-CM

## 2025-03-05 DIAGNOSIS — J02.9 SORE THROAT: ICD-10-CM

## 2025-03-05 PROBLEM — J01.90 ACUTE SINUSITIS: Status: ACTIVE | Noted: 2024-12-31

## 2025-03-05 LAB
CONTROL LINE PRESENT WITH A CLEAR BACKGROUND (YES/NO): YES YES/NO
COVID19 BINAX NOW ANTIGEN: DETECTED
KIT LOT #: NORMAL NUMERIC
OPERATOR ID: ABNORMAL
STREP GRP A CUL-SCR: NEGATIVE

## 2025-03-05 PROCEDURE — 87880 STREP A ASSAY W/OPTIC: CPT | Performed by: NURSE PRACTITIONER

## 2025-03-05 PROCEDURE — 99214 OFFICE O/P EST MOD 30 MIN: CPT | Performed by: NURSE PRACTITIONER

## 2025-03-05 RX ORDER — BENZONATATE 200 MG/1
200 CAPSULE ORAL 3 TIMES DAILY PRN
Qty: 21 CAPSULE | Refills: 0 | Status: SHIPPED | OUTPATIENT
Start: 2025-03-05 | End: 2025-03-12

## 2025-03-05 RX ORDER — FLUTICASONE PROPIONATE 50 MCG
SPRAY, SUSPENSION (ML) NASAL
COMMUNITY
Start: 2024-12-31 | End: 2025-03-05

## 2025-03-05 NOTE — PATIENT INSTRUCTIONS
Paxlovid - take 2 pills twice daily for 5 days. This doesn't cure covid, but helps stop the virus from replicating so you'll hopefully feel better more quickly.    If you have side effects with the medication (upset stomach, diarrhea) you can stop the medication.    Typical viral illness can last 7-10 days with cough that can linger for 1-2 weeks.    Over the counter symptom management:  Try Saline nasal spray or Fluticasone (Flonase) 1 spray each nostril twice daily for congestion.    Over the counter decongestant of your choice - i.e. mucinex-D, claritin-D, or combination medications like dayquil/nyquil.    For cough: [employer] Naturals, Zarbees, Robitussin, Guaifenesin    Acetaminophen every 4-6 hours or Ibuprofen every 6-8 hours for pain/fever.     Drink plenty of fluids. Tea with honey, honey-based throat lozenges,    Humidifier in bedroom.

## 2025-03-05 NOTE — PROGRESS NOTES
Subjective:   Apple Craig is a 59 year old female with PMH hypothyroidism who presents for Sore Throat (Patient requesting to be tested for strep )     Flew in from [place] last Thurs - multiple sick contacts  Sore throat started acutely last night, woke with chills, HA, dry hacking cough - throat raw from coughing. +anterior cervical LN tenderness.  No fever  No covid or flu testing  Robitussin - helped for a bit  Took 1 tab tylenol without relief  No difficulty breathing. No abd pain, n/v/d. No rash.    History/Other:    Chief Complaint Reviewed and Verified  Nursing Notes Reviewed and   Verified  Tobacco Reviewed  Allergies Reviewed  Medications Reviewed    Problem List Reviewed  Medical History Reviewed  Surgical History   Reviewed  OB Status Reviewed  Family History Reviewed  Social History   Reviewed         Tobacco:  She has never smoked tobacco.    Current Outpatient Medications   Medication Sig Dispense Refill    nirmatrelvir-ritonavir 300-100 MG Oral Tablet Therapy Pack Take two nirmatrelvir tablets (300mg) with one ritonavir tablet (100mg) together twice daily for 5 days. 30 tablet 0    benzonatate 200 MG Oral Cap Take 1 capsule (200 mg total) by mouth 3 (three) times daily as needed. 21 capsule 0    levothyroxine 88 MCG Oral Tab Take 1 tablet (88 mcg total) by mouth before breakfast. 90 tablet 0    ergocalciferol 1.25 MG (72542 UT) Oral Cap Take 1 capsule (50,000 Units total) by mouth every 30 (thirty) days. 3 capsule 3    fluticasone propionate 50 MCG/ACT Nasal Suspension SHAKE LIQUID AND USE 2 SPRAYS IN EACH NOSTRIL DAILY FOR 3 WEEKS (Patient not taking: Reported on 3/5/2025)      levothyroxine 100 MCG Oral Tab Take 1 tablet (100 mcg total) by mouth daily. (Patient not taking: Reported on 3/5/2025) 90 tablet 0    semaglutide 4 MG/3ML Subcutaneous Solution Pen-injector Inject 1 mg into the skin once a week. (Patient not taking: Reported on 3/5/2025)           Review of Systems:  Review  of Systems  10 point review of systems otherwise negative with the exception of HPI and assessment and plan.    Objective:   /72   Pulse 102   Temp 98.3 °F (36.8 °C) (Temporal)   Ht 5' 3\" (1.6 m)   Wt 170 lb 3.2 oz (77.2 kg)   LMP 01/01/2012 (Approximate)   SpO2 98%   BMI 30.15 kg/m²  Estimated body mass index is 30.15 kg/m² as calculated from the following:    Height as of this encounter: 5' 3\" (1.6 m).    Weight as of this encounter: 170 lb 3.2 oz (77.2 kg).      Physical Exam  Vitals reviewed.   Constitutional:       General: She is not in acute distress.     Appearance: She is not ill-appearing.   HENT:      Right Ear: Tympanic membrane normal.      Left Ear: Tympanic membrane normal.      Nose: Congestion present.      Mouth/Throat:      Mouth: Mucous membranes are moist.      Pharynx: Pharyngeal swelling and posterior oropharyngeal erythema present. No postnasal drip.      Tonsils: No tonsillar exudate. 1+ on the right. 1+ on the left.   Eyes:      Conjunctiva/sclera: Conjunctivae normal.   Cardiovascular:      Rate and Rhythm: Normal rate and regular rhythm.      Heart sounds: Normal heart sounds.   Pulmonary:      Effort: Pulmonary effort is normal. No respiratory distress.      Breath sounds: Normal breath sounds.   Musculoskeletal:      Cervical back: Neck supple. Tenderness present.   Lymphadenopathy:      Cervical: Cervical adenopathy present.   Skin:     General: Skin is warm and dry.   Neurological:      Mental Status: She is alert.         Assessment & Plan:   1. Lab test positive for detection of COVID-19 virus (Primary)  -     Nirmatrelvir&Ritonavir 300/100; Take two nirmatrelvir tablets (300mg) with one ritonavir tablet (100mg) together twice daily for 5 days.  Dispense: 30 tablet; Refill: 0  -     Benzonatate; Take 1 capsule (200 mg total) by mouth 3 (three) times daily as needed.  Dispense: 21 capsule; Refill: 0  - Paxlovid twice daily for 5 days - may discontinue if she experiences  undesirable GI side effects. Note - AVS indicated her out of pocket cost may be $1200 - explained this is not an essential medication and she does not need to take if the cost is actually $1200.  - Discussed current isolation guidelines - wear a mask if she needs to be in public for the next 5 days. Works from home, does not need work note.  - Provided with written instructions on OTC symptom management suggestions.  - Return for new difficulty breathing/shortness of breath.  2. Influenza-like illness  -     COVID19 BinaxNOW Antigen  -     Benzonatate; Take 1 capsule (200 mg total) by mouth 3 (three) times daily as needed.  Dispense: 21 capsule; Refill: 0  - As above.  3. Sore throat  -     COVID19 BinaxNOW Antigen  -     Strep A Assay W/Optic  - As above.        Return if symptoms worsen or fail to improve.    FREDDIE Roman, 3/5/2025, 12:57 PM     This note was prepared using Dragon Medical voice recognition dictation software. As a result errors may occur. When identified, these errors have been corrected. While every attempt is made to correct errors during dictation discrepancies may still exist.

## 2025-03-25 ENCOUNTER — TELEPHONE (OUTPATIENT)
Dept: INTERNAL MEDICINE CLINIC | Facility: CLINIC | Age: 60
End: 2025-03-25

## 2025-03-25 NOTE — TELEPHONE ENCOUNTER
Dr Santamaria and CLINICAL STAFF =please assist .       Per patient, she is scheduled for the right eye  cochlear implant at [facility] on 4/4/25.  She was cleared by Dr Santamaria last December but the surgery was not done .   Per patient, her surgeon instructed her to contact her primary provider to ADDEND the previous surgical clearance .And requested to fax it at [phone].     No future appointments.

## 2025-03-25 NOTE — TELEPHONE ENCOUNTER
Spoke to patient, full name and date of birth verified.  Explained that Dr. Santamaria legally needs to see the patient within 30 days of her surgery.  Surgery is 4/4/25.     Patient stated she will repeat the TSH lab orders.   Patient prefers [place] location.     Pre-op appointment scheduled for 3/27/25 at 11:20 AM.

## 2025-03-26 ENCOUNTER — LAB ENCOUNTER (OUTPATIENT)
Dept: LAB | Age: 60
End: 2025-03-26
Attending: INTERNAL MEDICINE
Payer: COMMERCIAL

## 2025-03-26 DIAGNOSIS — E03.9 HYPOTHYROIDISM, UNSPECIFIED TYPE: ICD-10-CM

## 2025-03-26 LAB
T4 FREE SERPL-MCNC: 1.6 NG/DL (ref 0.8–1.7)
TSI SER-ACNC: 2.23 UIU/ML (ref 0.55–4.78)

## 2025-03-26 PROCEDURE — 36415 COLL VENOUS BLD VENIPUNCTURE: CPT

## 2025-03-26 PROCEDURE — 84439 ASSAY OF FREE THYROXINE: CPT

## 2025-03-26 PROCEDURE — 84443 ASSAY THYROID STIM HORMONE: CPT

## 2025-04-01 ENCOUNTER — OFFICE VISIT (OUTPATIENT)
Dept: INTERNAL MEDICINE CLINIC | Facility: CLINIC | Age: 60
End: 2025-04-01
Payer: COMMERCIAL

## 2025-04-01 ENCOUNTER — LAB ENCOUNTER (OUTPATIENT)
Dept: LAB | Age: 60
End: 2025-04-01
Attending: INTERNAL MEDICINE
Payer: COMMERCIAL

## 2025-04-01 ENCOUNTER — EKG ENCOUNTER (OUTPATIENT)
Dept: LAB | Age: 60
End: 2025-04-01
Attending: INTERNAL MEDICINE
Payer: COMMERCIAL

## 2025-04-01 VITALS
HEART RATE: 88 BPM | BODY MASS INDEX: 30.65 KG/M2 | TEMPERATURE: 98 F | SYSTOLIC BLOOD PRESSURE: 133 MMHG | WEIGHT: 173 LBS | HEIGHT: 63 IN | DIASTOLIC BLOOD PRESSURE: 81 MMHG

## 2025-04-01 DIAGNOSIS — Z01.818 PREOP EXAM FOR INTERNAL MEDICINE: ICD-10-CM

## 2025-04-01 DIAGNOSIS — Z01.818 PREOP EXAM FOR INTERNAL MEDICINE: Primary | ICD-10-CM

## 2025-04-01 DIAGNOSIS — E03.9 HYPOTHYROIDISM, UNSPECIFIED TYPE: ICD-10-CM

## 2025-04-01 LAB
ATRIAL RATE: 73 BPM
P AXIS: 51 DEGREES
P-R INTERVAL: 160 MS
Q-T INTERVAL: 390 MS
QRS DURATION: 104 MS
QTC CALCULATION (BEZET): 429 MS
R AXIS: 34 DEGREES
T AXIS: 27 DEGREES
VENTRICULAR RATE: 73 BPM

## 2025-04-01 PROCEDURE — 93010 ELECTROCARDIOGRAM REPORT: CPT | Performed by: STUDENT IN AN ORGANIZED HEALTH CARE EDUCATION/TRAINING PROGRAM

## 2025-04-01 PROCEDURE — 99214 OFFICE O/P EST MOD 30 MIN: CPT | Performed by: INTERNAL MEDICINE

## 2025-04-01 PROCEDURE — 93005 ELECTROCARDIOGRAM TRACING: CPT

## 2025-04-01 NOTE — PROGRESS NOTES
HPI:    Patient ID: Apple Craig is a 59 year old female.    HPI  Apple Craig presents for preoperative clearance for: Right Cochlear Implant   Performing Physician:Fermin Corley MD  Neurotology  NPI: [id number]  [address] [address]     Phone: [phone]  Fax: [phone]  Date of surgery: 4/4/25     Elective or emergency: elective     Cervical/neck:   - Arthritis: no  - Neck pain: no  - Difficulty with ROM: no  - Prior neck injury/surgery: no     Cardiac:  - History of ischemic coronary disease: no  - History of heart failure: no  - Heart attack in past 90 days: no  - Chest pain in last 90 days: no  - History of Arrhythmia:  no  - History of stroke or TIA:no  - Diabetes/A1C: Last A1c value was 5.3% done 5/8/2024.    - Most recent echo/Stress test: none        Pulmonary:   - Smoker: no  - Apnea/CPAP:no  - Asthma/COPD:no  - Difficulty laying flat for extended period of time: no  - Dyspnea/exertional: no        Functional Capacity:   Perform ADLs- eating, dress, toilet (1 METs)?yes  Walk up flight of steps, hill, walk ground level 3-4mph (4 METs)? yes  Perform heavy housework- scrubbing floors, move heavy furniture, climb 2 flights of stairs (4-10 METs)?yes  Participate in strenuous sports- swimming, singles tennis, football, basketball, ski (+10 METs)?no     Risk Assessment Tools:  body mass index is 28.87 kg/m².      The patient's ASCVD 10 year risk score is 2.6.    LMP 01/01/2012 (Approximate)   Wt Readings from Last 6 Encounters:   03/05/25 170 lb 3.2 oz (77.2 kg)   12/27/24 166 lb (75.3 kg)   12/10/24 163 lb (73.9 kg)   12/09/24 163 lb (73.9 kg)   12/04/24 163 lb (73.9 kg)   08/26/24 158 lb (71.7 kg)     There is no height or weight on file to calculate BMI.  HGBA1C:    Lab Results   Component Value Date    A1C 5.3 05/08/2024    A1C 5.5 12/09/2023    A1C 5.4 12/03/2022     05/08/2024         Review of Systems   Constitutional:  Negative for activity change, chills,  fatigue and fever.   HENT:  Positive for hearing loss. Negative for ear discharge, nosebleeds, postnasal drip, rhinorrhea, sinus pressure and sore throat.    Eyes:  Negative for pain, discharge and redness.   Respiratory:  Negative for cough, chest tightness, shortness of breath and wheezing.    Cardiovascular:  Negative for chest pain, palpitations and leg swelling.   Gastrointestinal:  Negative for abdominal pain, blood in stool, constipation, diarrhea, nausea and vomiting.   Genitourinary:  Negative for difficulty urinating, dysuria, frequency, hematuria and urgency.   Musculoskeletal:  Negative for back pain, gait problem and joint swelling.   Skin:  Negative for rash.   Neurological:  Negative for syncope, weakness, light-headedness and headaches.   Psychiatric/Behavioral:  Negative for dysphoric mood. The patient is not nervous/anxious.          Current Outpatient Medications   Medication Sig Dispense Refill    nirmatrelvir-ritonavir 300-100 MG Oral Tablet Therapy Pack Take two nirmatrelvir tablets (300mg) with one ritonavir tablet (100mg) together twice daily for 5 days. 30 tablet 0    levothyroxine 88 MCG Oral Tab Take 1 tablet (88 mcg total) by mouth before breakfast. 90 tablet 0    semaglutide 4 MG/3ML Subcutaneous Solution Pen-injector Inject 1 mg into the skin once a week. (Patient not taking: Reported on 3/5/2025)       Allergies:Allergies[1]    HISTORY:  Past Medical History:    Cyst of left breast    drainage    Disorder of thyroid    Primary hypothyroidism    Sleep apnea      Past Surgical History:   Procedure Laterality Date    Colonoscopy N/A 01/18/2024    Dr. Duarte; colon polyp    Colonoscopy N/A 1/18/2024    Procedure: COLONOSCOPY;  Surgeon: Denise Duarte MD;  Location: [facility] ENDOSCOPY    Cyst aspiration left        Family History   Problem Relation Age of Onset    Diabetes Father     Breast Cancer Maternal Grandmother 60    Hypertension Paternal Grandmother     Breast Cancer Maternal Cousin Female  45      Social History:   Social History     Socioeconomic History    Marital status:    Tobacco Use    Smoking status: Never     Passive exposure: Never    Smokeless tobacco: Never   Vaping Use    Vaping status: Never Used   Substance and Sexual Activity    Alcohol use: No    Drug use: Never    Sexual activity: Not Currently   Other Topics Concern    Caffeine Concern No        PHYSICAL EXAM:    Physical Exam  Constitutional:       General: She is not in acute distress.     Appearance: She is well-developed. She is not diaphoretic.   HENT:      Head: Normocephalic and atraumatic.      Comments: Chronic hearing loss     Right Ear: Ear canal normal.      Left Ear: Ear canal normal.      Nose: Nose normal.      Mouth/Throat:      Pharynx: No oropharyngeal exudate or posterior oropharyngeal erythema.   Eyes:      General: No scleral icterus.        Right eye: No discharge.         Left eye: No discharge.      Conjunctiva/sclera: Conjunctivae normal.      Pupils: Pupils are equal, round, and reactive to light.   Cardiovascular:      Rate and Rhythm: Normal rate and regular rhythm.      Heart sounds: Normal heart sounds. No murmur heard.  Pulmonary:      Effort: Pulmonary effort is normal. No respiratory distress.      Breath sounds: Normal breath sounds. No wheezing.   Abdominal:      General: Bowel sounds are normal.      Palpations: Abdomen is soft. There is no mass.      Tenderness: There is no abdominal tenderness. There is no guarding or rebound.      Hernia: No hernia is present.   Musculoskeletal:         General: No tenderness.   Skin:     General: Skin is warm and dry.      Findings: No lesion or rash.   Neurological:      Mental Status: She is alert.      Gait: Gait normal.   Psychiatric:         Behavior: Behavior normal.         Thought Content: Thought content normal.       Component      Latest Ref [facility] 3/26/2025   T4,Free (Direct)      0.8 - 1.7 ng/dL 1.6    TSH      0.550 - 4.780 uIU/mL 2.225         Component      Latest Ref Rng 12/10/2024 2/8/2025   WBC      4.0 - 11.0 x10(3) uL 3.3 (L)     RBC      3.80 - 5.30 x10(6)uL 4.18     Hemoglobin      12.0 - 16.0 g/dL 12.6     Hematocrit      35.0 - 48.0 % 38.3     MCV      80.0 - 100.0 fL 91.6     MCH      26.0 - 34.0 pg 30.1     MCHC      31.0 - 37.0 g/dL 32.9     RDW-SD      35.1 - 46.3 fL 43.3     RDW      11.0 - 15.0 % 13.1     Platelet Count      150.0 - 450.0 10(3)uL 183.0     Prelim Neutrophil Abs      1.50 - 7.70 x10 (3) uL 1.78     Neutrophils Absolute      1.50 - 7.70 x10(3) uL 1.78     Lymphocytes Absolute      1.00 - 4.00 x10(3) uL 1.14     Monocytes Absolute      0.10 - 1.00 x10(3) uL 0.31     Eosinophils Absolute      0.00 - 0.70 x10(3) uL 0.03     Basophils Absolute      0.00 - 0.20 x10(3) uL 0.01     Immature Granulocyte Absolute      0.00 - 1.00 x10(3) uL 0.01     Neutrophils %      % 54.2     Lymphocytes %      % 34.8     Monocytes %      % 9.5     Eosinophils %      % 0.9     Basophils %      % 0.3     Immature Granulocyte %      % 0.3     Glucose      70 - 99 mg/dL 89     Sodium      136 - 145 mmol/L 144     Potassium      3.5 - 5.1 mmol/L 4.6     Chloride      98 - 112 mmol/L 108     Carbon Dioxide, Total      21.0 - 32.0 mmol/L 28.0     ANION GAP      0 - 18 mmol/L 8     BUN      9 - 23 mg/dL 18     CREATININE      0.55 - 1.02 mg/dL 0.67     BUN/CREATININE RATIO      10.0 - 20.0  26.9 (H)     CALCIUM      8.7 - 10.4 mg/dL 10.1     CALCULATED OSMOLALITY      275 - 295 mOsm/kg 299 (H)     EGFR      >=60 mL/min/1.73m2 101     ALT (SGPT)      10 - 49 U/L 30     AST (SGOT)      <34 U/L 24     ALKALINE PHOSPHATASE      46 - 118 U/L 88     Total Bilirubin      0.3 - 1.2 mg/dL 0.4     PROTEIN, TOTAL      5.7 - 8.2 g/dL 8.2     Albumin      3.2 - 4.8 g/dL 4.6     Globulin      2.0 - 3.5 g/dL 3.6 (H)     A/G Ratio      1.0 - 2.0  1.3     Patient Fasting for CMP? Yes     Color Urine      Yellow  Yellow     Clarity Urine      Clear  Ex.Turbid !      Spec Gravity      1.005 - 1.030  1.025     Glucose Urine      Normal mg/dL Normal     Bilirubin Urine      Negative  Negative     Ketones, UA      Negative mg/dL Negative     Blood Urine      Negative  1+ !     PH Urine      5.0 - 8.0  5.5     Protein Urine      Negative mg/dL Trace !     Urobilinogen Urine      Normal  Normal     Nitrite Urine      Negative  Negative     Leukocyte Esterase       Negative  Negative     WBC Urine      0 - 5 /HPF None Seen     RBC Urine      0 - 2 /HPF None Seen     Bacteria Urine      None Seen /HPF None Seen     SQUAM EPI CELLS UR      None Seen /HPF None Seen     RENAL TUBULAR EPITHELIAL CELLS      None Seen /HPF None Seen     TRANSITIONAL EPI CELLS      None Seen /HPF None Seen     YEAST URINE      None Seen /HPF None Seen     Cholesterol, Total      <200 mg/dL 211 (H)     HDL Cholesterol      40 - 59 mg/dL 74 (H)     Triglycerides      30 - 149 mg/dL 63     LDL Cholesterol Calc      <100 mg/dL 126 (H)     VLDL      0 - 30 mg/dL 11     NON-HDL CHOLESTEROL      <130 mg/dL 137 (H)     Patient Fasting for Lipid? Yes     T4,Free (Direct)      0.8 - 1.7 ng/dL  1.7    TSH      0.550 - 4.780 uIU/mL  0.328 (L)    PT      11.6 - 14.8 seconds 12.6     INR      0.80 - 1.20  0.89     Vitamin B12      211 - 911 pg/mL 348     APTT      23.0 - 36.0 seconds 28.0     MRSA by PCR      Negative  Negative     VITAMIN D, 25-OH, TOTAL      30.0 - 100.0 ng/mL 30.3     T3 Total      0.6 - 1.81 ng/mL  1.08       Legend:  (L) Low  (H) High  ! Abnormal  ormal sinus rhythm with sinus arrhythmia  Normal ECG  When compared with ECG of 01-DEC-2023 17:25,  Incomplete right bundle branch block is no longer Present  Confirmed by EDILSON NORMAN JORDAN ([id number]) on 12/10/2024 2:05:57 PM     Specimen Collected: 12/10/24 10:18 AM Last Resulted: 12/10/24  2:05 PM           PROCEDURE:XR CHEST PA + LAT CHEST (CPT=71046)     COMPARISON:[facility], X CHEST PA LAT ROUTINE, 4/13/2005, 12:11 PM.      INDICATIONS:Cough x1 month.     TECHNIQUE:    Two views.       FINDINGS:   CARDIAC/VASC:           Normal.  No cardiac silhouette abnormality or cardiomegaly.  Unremarkable pulmonary vasculature.    MEDIAST/FERNANDO:             No visible mass or adenopathy.   LUNGS/PLEURA:           Normal.  No significant pulmonary parenchymal abnormalities.  No effusion or pleural thickening.    BONES:             Mild degenerative disc disease and spondylosis without visible acute abnormalities.    OTHER:             Negative.                =====  CONCLUSION:No acute cardiopulmonary abnormality.           Dictated by (CST): Luis Rider MD on 8/27/2024 at 10:17 AM                    Normal sinus rhythm  Normal ECG  When compared with ECG of 10-DEC-2024 10:18,  No significant change was found  Confirmed by KATERINA CRAIN ([id number]) on 4/1/2025 3:52:29         Allergies  Review status set to Review Complete on 3/5/2025  No Known Allergies  Current Immunizations   Reviewed on 3/5/2025  Name Date   Covid-19 Moderna 6/24/2021, 5/27/2021   INFLUENZA 12/9/2024, 12/8/2023, 12/2/2022, 11/1/2011, 10/2/2008   Pneumococcal Vaccine (Prevnar 20) 2/5/2025   TDAP 3/29/2017   Zoster Vaccine Recombinant Adjuvanted (Shingrix)  Deferred (+Patient Refuses Z28.21), 12/2/2022     ASSESSMENT/PLAN:   Z01.818) Preop exam for internal medicine  (primary encounter diagnosis)  Plan: CBC With Differential With Platelet,         Prothrombin Time (PT), PTT, Activated,         Urinalysis with Culture Reflex, MRSA Screen by         PCR, EKG 12 Lead, CANCELED: Comp Metabolic         Panel (14)      She has  4 to 10 METS  of activity.    She  has no know cardiovascular or pulmonary disease   Given the information avalable int he above notes patient my proceed with surgical intervention as long as the benefit outweighs the risks at the time of the procedure.   Pre OP test results wihtin normal limti   Blood work  EKG   ordered  Thyroid function normal       Based on  history physical exam and test results  Pt is generally healthy  She is cleared to proceed with surgery as planned       (E03.9) Hypothyroidism, unspecified type  Plan:l  controlled     Patient voiced understanding  and agrees with plan                   No orders of the defined types were placed in this encounter.      Meds This Visit:  Requested Prescriptions      No prescriptions requested or ordered in this encounter       Imaging & Referrals:  None        ID#[id number]           [1] No Known Allergies

## 2025-04-02 ENCOUNTER — LAB ENCOUNTER (OUTPATIENT)
Dept: LAB | Age: 60
End: 2025-04-02
Attending: INTERNAL MEDICINE
Payer: COMMERCIAL

## 2025-04-02 DIAGNOSIS — Z01.818 PREOP EXAM FOR INTERNAL MEDICINE: ICD-10-CM

## 2025-04-02 LAB
ANION GAP SERPL CALC-SCNC: 6 MMOL/L (ref 0–18)
BUN BLD-MCNC: 14 MG/DL (ref 9–23)
BUN/CREAT SERPL: 19.7 (ref 10–20)
CALCIUM BLD-MCNC: 9.6 MG/DL (ref 8.7–10.4)
CHLORIDE SERPL-SCNC: 103 MMOL/L (ref 98–112)
CO2 SERPL-SCNC: 31 MMOL/L (ref 21–32)
CREAT BLD-MCNC: 0.71 MG/DL
DEPRECATED RDW RBC AUTO: 44.4 FL (ref 35.1–46.3)
EGFRCR SERPLBLD CKD-EPI 2021: 98 ML/MIN/1.73M2 (ref 60–?)
ERYTHROCYTE [DISTWIDTH] IN BLOOD BY AUTOMATED COUNT: 13.6 % (ref 11–15)
FASTING STATUS PATIENT QL REPORTED: YES
GLUCOSE BLD-MCNC: 82 MG/DL (ref 70–99)
HCT VFR BLD AUTO: 36.7 %
HGB BLD-MCNC: 12.4 G/DL
MCH RBC QN AUTO: 30.4 PG (ref 26–34)
MCHC RBC AUTO-ENTMCNC: 33.8 G/DL (ref 31–37)
MCV RBC AUTO: 90 FL
OSMOLALITY SERPL CALC.SUM OF ELEC: 290 MOSM/KG (ref 275–295)
PLATELET # BLD AUTO: 203 10(3)UL (ref 150–450)
PLATELETS.RETICULATED NFR BLD AUTO: 5.2 % (ref 0–7)
POTASSIUM SERPL-SCNC: 4.9 MMOL/L (ref 3.5–5.1)
RBC # BLD AUTO: 4.08 X10(6)UL
SODIUM SERPL-SCNC: 140 MMOL/L (ref 136–145)
WBC # BLD AUTO: 3 X10(3) UL (ref 4–11)

## 2025-04-02 PROCEDURE — 36415 COLL VENOUS BLD VENIPUNCTURE: CPT

## 2025-04-02 PROCEDURE — 85027 COMPLETE CBC AUTOMATED: CPT

## 2025-04-02 PROCEDURE — 80048 BASIC METABOLIC PNL TOTAL CA: CPT

## 2025-04-17 ENCOUNTER — MED REC SCAN ONLY (OUTPATIENT)
Dept: INTERNAL MEDICINE CLINIC | Facility: CLINIC | Age: 60
End: 2025-04-17

## 2025-04-22 RX ORDER — LEVOTHYROXINE SODIUM 100 UG/1
100 TABLET ORAL DAILY
Qty: 90 TABLET | Refills: 0 | OUTPATIENT
Start: 2025-04-22

## 2025-04-25 RX ORDER — LEVOTHYROXINE SODIUM 88 UG/1
88 TABLET ORAL
Qty: 90 TABLET | Refills: 3 | Status: SHIPPED | OUTPATIENT
Start: 2025-04-25

## 2025-04-25 NOTE — TELEPHONE ENCOUNTER
Refill Per Protocol     Requested Prescriptions   Pending Prescriptions Disp Refills    levothyroxine 88 MCG Oral Tab 90 tablet 0     Sig: Take 1 tablet (88 mcg total) by mouth before breakfast.       Thyroid Medication Protocol Passed - 4/25/2025 10:25 AM        Passed - TSH in past 12 months        Passed - Last TSH value is normal     Lab Results   Component Value Date    TSH 2.225 03/26/2025                 Passed - In person appointment or virtual visit in the past 12 mos or appointment in next 3 mos     Recent Outpatient Visits              3 weeks ago [facility]    [facility]    1 month ago Lab test positive for detection of COVID-19 virus    [facility]    Office Visit    2 months ago Vitamin B 12 deficiency    [facility]    Nurse Only    3 months ago Acute non-recurrent sphenoidal sinusitis    [facility] [place], Yasmin Irfan, MD    Office Visit    4 months ago Preop exam for internal medicine    [facility]    Office Visit                      Passed - Medication is active on med list

## (undated) DEVICE — KIT CLEAN ENDOKIT 1.1OZ GOWNX2

## (undated) DEVICE — 60 ML SYRINGE REGULAR TIP: Brand: MONOJECT

## (undated) DEVICE — SNARE OPTMZ PLPCTM TRP

## (undated) DEVICE — MEDI-VAC NON-CONDUCTIVE SUCTION TUBING 6MM X 1.8M (6FT.) L: Brand: CARDINAL HEALTH

## (undated) DEVICE — LASSO POLYPECTOMY SNARE: Brand: LASSO

## (undated) DEVICE — KIT ENDO ORCAPOD 160/180/190

## (undated) DEVICE — Device: Brand: DUAL NARE NASAL CANNULAE FEMALE LUER CON 7FT O2 TUBE

## (undated) NOTE — LETTER
May 16, 2020     Nikita Martel      Dear Kavitha Benavides:    Below are the results from your recent visit:    Resulted Orders   VITAMIN D, 25-HYDROXY   Result Value Ref Range    Vitamin D, 25OH, Total 19.4 (L) 30.0 - 100 Estimated Average Glucose 114 68 - 126 mg/dL       If you have any questions or concerns, please don't hesitate to call.      Dr. Carlos Wilkins

## (undated) NOTE — LETTER
12/10/20        Jenise Martel      Dear Renetta Franklin,    [address] [facility] records indicate that you have outstanding lab work and or testing that was ordered for you and has not yet been completed:  Orders Placed This Encounter

## (undated) NOTE — LETTER
No referring provider defined for this encounter. 11/29/21        Patient: Kenny Mesa   YOB: 1965   Date of Visit: 11/29/2021       Dear  Dr. Pooja Smalls MD,      Thank you for referring Kenny Mesa to my practice.   Please fin

## (undated) NOTE — LETTER
[address], [place], [place]  Authorization for Surgical Operation and Procedure                                                                                           I hereby authorize Denise Duarte MD, my physician and his/her assistants (if applicable), which may include medical students, residents, and/or fellows, to perform the following surgical operation/ procedure and administer such anesthesia as may be determined necessary by my physician: Operation/Procedure name (s) COLONOSCOPY on Apple Craig   2.   I recognize that during the surgical operation/procedure, unforeseen conditions may necessitate additional or different procedures than those listed above.  I, therefore, further authorize and request that the above-named surgeon, assistants, or designees perform such procedures as are, in their judgment, necessary and desirable.    3.   My surgeon/physician has discussed prior to my surgery the potential benefits, risks and side effects of this procedure; the likelihood of achieving goals; and potential problems that might occur during recuperation.  They also discussed reasonable alternatives to the procedure, including risks, benefits, and side effects related to the alternatives and risks related to not receiving this procedure.  I have had all my questions answered and I acknowledge that no guarantee has been made as to the result that may be obtained.    4.   Should the need arise during my operation/procedure, which includes change of level of care prior to discharge, I also consent to the administration of blood and/or blood products.  Further, I understand that despite careful testing and screening of blood or blood products by collecting agencies, I may still be subject to ill effects as a result of receiving a blood transfusion and/or blood products.  The following are some, but not all, of the potential risks that can occur: fever and allergic reactions,  hemolytic reactions, transmission of diseases such as Hepatitis, AIDS and Cytomegalovirus (CMV) and fluid overload.  In the event that I wish to have an autologous transfusion of my own blood, or a directed donor transfusion, I will discuss this with my physician.  Check only if Refusing Blood or Blood Products  I understand refusal of blood or blood products as deemed necessary by my physician may have serious consequences to my condition to include possible death. I hereby assume responsibility for my refusal and release the hospital, its personnel, and my physicians from any responsibility for the consequences of my refusal.    o  Refuse   5.   I authorize the use of any specimen, organs, tissues, body parts or foreign objects that may be removed from my body during the operation/procedure for diagnosis, research or teaching purposes and their subsequent disposal by hospital authorities.  I also authorize the release of specimen test results and/or written reports to my treating physician on the hospital medical staff or other referring or consulting physicians involved in my care, at the discretion of the Pathologist or my treating physician.    6.   I consent to the photographing or videotaping of the operations or procedures to be performed, including appropriate portions of my body for medical, scientific, or educational purposes, provided my identity is not revealed by the pictures or by descriptive texts accompanying them.  If the procedure has been photographed/videotaped, the surgeon will obtain the original picture, image, videotape or CD.  The hospital will not be responsible for storage, release or maintenance of the picture, image, tape or CD.    7.   I consent to the presence of a  or observers in the operating room as deemed necessary by my physician or their designees.    8.   I recognize that in the event my procedure results in extended X-Ray/fluoroscopy time, I may develop a  skin reaction.    9. If I have a Do Not Attempt Resuscitation (DNAR) order in place, that status will be suspended while in the operating room, procedural suite, and during the recovery period unless otherwise explicitly stated by me (or a person authorized to consent on my behalf). The surgeon or my attending physician will determine when the applicable recovery period ends for purposes of reinstating the DNAR order.  10. Patients having a sterilization procedure: I understand that if the procedure is successful the results will be permanent and it will therefore be impossible for me to inseminate, conceive, or bear children.  I also understand that the procedure is intended to result in sterility, although the result has not been guaranteed.   11. I acknowledge that my physician has explained sedation/analgesia administration to me including the risk and benefits I consent to the administration of sedation/analgesia as may be necessary or desirable in the judgment of my physician.    I CERTIFY THAT I HAVE READ AND FULLY UNDERSTAND THE ABOVE CONSENT TO OPERATION and/or OTHER PROCEDURE.     _________________________________________ _________________________________     ___________________________________  Signature of Patient     Signature of Responsible Person                   Printed Name of Responsible Person                              _________________________________________ ______________________________        ___________________________________  Signature of Witness         Date  Time         Relationship to Patient    STATEMENT OF PHYSICIAN My signature below affirms that prior to the time of the procedure; I have explained to the patient and/or his/her legal representative, the risks and benefits involved in the proposed treatment and any reasonable alternative to the proposed treatment. I have also explained the risks and benefits involved in refusal of the proposed treatment and alternatives to the  proposed treatment and have answered the patient's questions. If I have a significant financial interest in a co-management agreement or a significant financial interest in any product or implant, or other significant relationship used in this procedure/surgery, I have disclosed this and had a discussion with my patient.     _______________________________________________________________ _____________________________  (Signature of Physician)                                                                                         (Date)                                   (Time)  Patient Name: Apple Craig    : 1965   Printed: 2024      Medical Record #: [id number]                                              Page 1 of

## (undated) NOTE — LETTER
12/9/2024              Apple Craig        [address] [facility] [address]         To Whom it May Concern:     The above named patient is under my medical care and was seen in office today for her annual physical exam.     Sincerely,         FREDDIE Ryan          Document electronically generated by:  FREDDIE Ryan